# Patient Record
Sex: MALE | Race: BLACK OR AFRICAN AMERICAN | NOT HISPANIC OR LATINO | Employment: UNEMPLOYED | URBAN - METROPOLITAN AREA
[De-identification: names, ages, dates, MRNs, and addresses within clinical notes are randomized per-mention and may not be internally consistent; named-entity substitution may affect disease eponyms.]

---

## 2020-01-19 PROBLEM — S82.142A CLOSED BICONDYLAR FRACTURE OF LEFT TIBIAL PLATEAU: Status: ACTIVE | Noted: 2020-01-19

## 2020-01-19 NOTE — H&P
ELIAS Hill is  a pleasant 34-year-old male who comes into the office today for evaluation and management of his LEFT knee injury.  his history is significant for a skiing accident while he was in [place].  This happened on January 10, 2020.  He was diagnosed with a closed fracture of LEFT tibial plateau and he underwent an external fixator placement.  He has been advised to use Lovenox injections.  He has been nonweightbearing on the LEFT lower extremity.  He is accompanied by his father to the office visit.  He lives in [place] and hence they have requested transfer to his home town.  His father has contacted my office for office appointment.  Patient denies any tingling, numbness of his toes.  Denies any pain out of proportion.  His pain has been reasonably well-controlled with oxycodone 5 mg tablets.  He has been taking 2 pills almost every 4 hours.  In addition, he is also using some ibuprofen.  The patient brings in his medical records including CT scan and x-rays of his injury on a disc.  he denies any other injuries.  Denies any history of MRSA, DVT.  Review of Systems:  Patient denies: Abdominal Pain, Bleeding, Chest Pain, Convulsions/Seizure, Decreased Motion, Depression, Difficulty Swallowing, Easy Bruisability, Emotional Disturbances, Eyes or Vision Problems, Fecal Incontinence, Fever/Chills, Headaches, Increased Thirst, Increased Hunger, Insomnia, Joint Pain, Nausea/Vomiting, Night Sweats, Poor Balance, Persistent Cough, Rash, Shortness of Breath, Shortness of Breath While Lying down, Skin Problems, Urinary Retention and Weakness.  Allergies:  * no known allergies  Medications:  * toradol   * oxycodone andacetaminophen   ibuprofen capsule (ibuprofen caps)   tylenol capsule (acetaminophen caps)   Patient History of:  BLOOD CLOTS/EMBOLISM - NEGATIVE  Surgical History:  Negative  Known Family History of:  Negative  Social History:  JORDAN RIZZO is a single 34 year old male.  He has never used  tobacco products.      Past medical, social, family histories and ROS reviewed today with the patient and changes documented in the chart (01/15/2020).    Physical Exam  Height:  72 in.    Weight:  165.0 lbs.     BMI:  22.46  Pulse:  83  Blood pressure:  126 / 72 mm Hg  Mental/HEENT/Cardio/Skin  The patient's general appearance is well developed, well nourished, no acute distress.  Orientation is alert and oriented x 3.  The patient's mood is normal.  A head exam reveals normocephalic/atraumatic.  An eye exam reveals pupils equal.  Pulmonary exam shows normal air exchange, no labored breathing, or shortness of breath.  A skin exam shows normal temperature and color in the area of examination.      Right Knee      Left Knee  Skin is normal. he has a posterior splint.  Splint was removed and skin was inspected.  No evidence of any blisters.  Pin tracts are healthy.  Patient shows no signs of DVT.  The DP Pulse is 2+.  Capillary refill is normal.  There is severe tenderness in the medial tibial plateau.    Compartments are soft.      Imaging/Diagnostic Studies  I have reviewed the injury film, and a CT scan, 3-D reconstructions with the patient and his father, both films and report was reviewed.  Impression  Left tibial plateau displaced fracture (QMI30-G34.142A)    Plan  The patient has a displaced tibial plateau fracture LEFT leg, status post external fixator placement .    Options were discussed including nonoperative management versus surgical intervention.    The patient is indicated for removal of the external fixator and open reduction and internal fixation of LEFT tibial plateau fracture.  he has severe intra-articular comminution.  I think he will best benefit from a posteromedial approach for addressing the medial tibial plateau, followed by a lateral plate.  The likely risks and benefits of the procedure including but not limited to infection, DVT, pulmonary embolism, malunion, nonunion, post traumatic  stiffness, post traumatic arthritis, possibility of injury to nerves, vessels, tendons have been discussed in detail with the patient and his father.  Despite the risks involved the patient would like to proceed.    The surgery is scheduled for removal of the external fixator and open reduction and internal fixation of LEFT tibial plateau fracture on Jan 21/ 23,2020.  surgery will be scheduled for postoperative observation/admission.  I have advised him to resume the Lovenox injections and discussed with him the importance of anticoagulation.  He is also advised to watch his opiate medication intake.  He is given a prescription for Percocet 7.5/325 mg 1 p.o. q.4 p.r.n. pain.  In addition, it is okay to continue ibuprofen.  Patient does not use tobacco.      We discussed the benefits of surgical intervention, as well as alternative treatments.  Potential surgical risks and complications include but are not limited to expected outcomes and the risk that the procedure may not accomplish the desired objective.  Sufficient opportunity was given to discuss the condition and treatment plan with the doctor, and all questions were answered for the patient.      Sergio should follow up with JENNY GATES MD post op.

## 2020-01-20 ENCOUNTER — HOSPITAL ENCOUNTER (OUTPATIENT)
Dept: GENERAL RADIOLOGY | Facility: HOSPITAL | Age: 35
Discharge: HOME | End: 2020-01-20

## 2020-01-20 ENCOUNTER — APPOINTMENT (OUTPATIENT)
Dept: PREADMISSION TESTING | Facility: HOSPITAL | Age: 35
End: 2020-01-20

## 2020-01-20 ENCOUNTER — HOSPITAL ENCOUNTER (OUTPATIENT)
Dept: GENERAL RADIOLOGY | Facility: HOSPITAL | Age: 35
Discharge: HOME | End: 2020-01-20
Admitting: ORTHOPAEDIC SURGERY

## 2020-01-20 VITALS
SYSTOLIC BLOOD PRESSURE: 123 MMHG | TEMPERATURE: 98.1 F | RESPIRATION RATE: 16 BRPM | BODY MASS INDEX: 22.35 KG/M2 | DIASTOLIC BLOOD PRESSURE: 66 MMHG | HEIGHT: 72 IN | WEIGHT: 165 LBS | OXYGEN SATURATION: 99 % | HEART RATE: 73 BPM

## 2020-01-20 LAB
ALBUMIN SERPL-MCNC: 3.9 G/DL (ref 3.5–5.2)
ALBUMIN/GLOB SERPL: 1.2 G/DL
ALP SERPL-CCNC: 63 U/L (ref 39–117)
ALT SERPL W P-5'-P-CCNC: 10 U/L (ref 1–41)
ANION GAP SERPL CALCULATED.3IONS-SCNC: 12.3 MMOL/L (ref 5–15)
ANISOCYTOSIS BLD QL: NORMAL
APTT PPP: 31.4 SECONDS (ref 22.7–35.4)
AST SERPL-CCNC: 17 U/L (ref 1–40)
BASOPHILS # BLD MANUAL: 0.06 10*3/MM3 (ref 0–0.2)
BASOPHILS NFR BLD AUTO: 1 % (ref 0–1.5)
BILIRUB SERPL-MCNC: 0.2 MG/DL (ref 0.2–1.2)
BUN BLD-MCNC: 25 MG/DL (ref 6–20)
BUN/CREAT SERPL: 26.3 (ref 7–25)
CALCIUM SPEC-SCNC: 9 MG/DL (ref 8.6–10.5)
CHLORIDE SERPL-SCNC: 96 MMOL/L (ref 98–107)
CO2 SERPL-SCNC: 26.7 MMOL/L (ref 22–29)
CREAT BLD-MCNC: 0.95 MG/DL (ref 0.76–1.27)
DEPRECATED RDW RBC AUTO: 38.5 FL (ref 37–54)
EOSINOPHIL # BLD MANUAL: 0.26 10*3/MM3 (ref 0–0.4)
EOSINOPHIL NFR BLD MANUAL: 4.1 % (ref 0.3–6.2)
ERYTHROCYTE [DISTWIDTH] IN BLOOD BY AUTOMATED COUNT: 14.4 % (ref 12.3–15.4)
GFR SERPL CREATININE-BSD FRML MDRD: 91 ML/MIN/1.73
GLOBULIN UR ELPH-MCNC: 3.3 GM/DL
GLUCOSE BLD-MCNC: 90 MG/DL (ref 65–99)
HCT VFR BLD AUTO: 36.9 % (ref 37.5–51)
HGB BLD-MCNC: 11.9 G/DL (ref 13–17.7)
INR PPP: 1 (ref 0.9–1.1)
LYMPHOCYTES # BLD MANUAL: 1.43 10*3/MM3 (ref 0.7–3.1)
LYMPHOCYTES NFR BLD MANUAL: 22.4 % (ref 19.6–45.3)
LYMPHOCYTES NFR BLD MANUAL: 5.1 % (ref 5–12)
MCH RBC QN AUTO: 24.1 PG (ref 26.6–33)
MCHC RBC AUTO-ENTMCNC: 32.2 G/DL (ref 31.5–35.7)
MCV RBC AUTO: 74.8 FL (ref 79–97)
MICROCYTES BLD QL: NORMAL
MONOCYTES # BLD AUTO: 0.33 10*3/MM3 (ref 0.1–0.9)
NEUTROPHILS # BLD AUTO: 4.29 10*3/MM3 (ref 1.7–7)
NEUTROPHILS NFR BLD MANUAL: 67.3 % (ref 42.7–76)
PLAT MORPH BLD: NORMAL
PLATELET # BLD AUTO: 440 10*3/MM3 (ref 140–450)
PMV BLD AUTO: 8.7 FL (ref 6–12)
POTASSIUM BLD-SCNC: 4.1 MMOL/L (ref 3.5–5.2)
PROT SERPL-MCNC: 7.2 G/DL (ref 6–8.5)
PROTHROMBIN TIME: 13 SECONDS (ref 11.7–14.2)
RBC # BLD AUTO: 4.93 10*6/MM3 (ref 4.14–5.8)
SODIUM BLD-SCNC: 135 MMOL/L (ref 136–145)
WBC MORPH BLD: NORMAL
WBC NRBC COR # BLD: 6.38 10*3/MM3 (ref 3.4–10.8)

## 2020-01-20 PROCEDURE — 80053 COMPREHEN METABOLIC PANEL: CPT | Performed by: ORTHOPAEDIC SURGERY

## 2020-01-20 PROCEDURE — 71046 X-RAY EXAM CHEST 2 VIEWS: CPT

## 2020-01-20 PROCEDURE — 85007 BL SMEAR W/DIFF WBC COUNT: CPT | Performed by: ORTHOPAEDIC SURGERY

## 2020-01-20 PROCEDURE — 36415 COLL VENOUS BLD VENIPUNCTURE: CPT

## 2020-01-20 PROCEDURE — 85610 PROTHROMBIN TIME: CPT | Performed by: ORTHOPAEDIC SURGERY

## 2020-01-20 PROCEDURE — 93005 ELECTROCARDIOGRAM TRACING: CPT

## 2020-01-20 PROCEDURE — 85730 THROMBOPLASTIN TIME PARTIAL: CPT | Performed by: ORTHOPAEDIC SURGERY

## 2020-01-20 PROCEDURE — 73590 X-RAY EXAM OF LOWER LEG: CPT

## 2020-01-20 PROCEDURE — 93010 ELECTROCARDIOGRAM REPORT: CPT | Performed by: INTERNAL MEDICINE

## 2020-01-20 PROCEDURE — 85025 COMPLETE CBC W/AUTO DIFF WBC: CPT | Performed by: ORTHOPAEDIC SURGERY

## 2020-01-20 RX ORDER — IBUPROFEN 200 MG
200 TABLET ORAL EVERY 6 HOURS PRN
COMMUNITY
End: 2020-03-23

## 2020-01-20 RX ORDER — OXYCODONE HYDROCHLORIDE AND ACETAMINOPHEN 5; 325 MG/1; MG/1
1 TABLET ORAL EVERY 6 HOURS PRN
COMMUNITY
End: 2021-08-12

## 2020-01-20 NOTE — DISCHARGE INSTRUCTIONS
Take the following medications the morning of surgery:    OXYCODONE    ARRIVE AT  1000.        General Instructions:  • Do not eat solid food after midnight the night before surgery.  • You may drink clear liquids day of surgery but must stop at least one hour before your hospital arrival time.  • It is beneficial for you to have a clear drink that contains carbohydrates the day of surgery.  We suggest a 12 to 20 ounce bottle of Gatorade or Powerade for non-diabetic patients or a 12 to 20 ounce bottle of G2 or Powerade Zero for diabetic patients. (Pediatric patients, are not advised to drink a 12 to 20 ounce carbohydrate drink)    Clear liquids are liquids you can see through.  Nothing red in color.     Plain water                               Sports drinks  Sodas                                   Gelatin (Jell-O)  Fruit juices without pulp such as white grape juice and apple juice  Popsicles that contain no fruit or yogurt  Tea or coffee (no cream or milk added)  Gatorade / Powerade  G2 / Powerade Zero    • Infants may have breast milk up to four hours before surgery.  • Infants drinking formula may drink formula up to six hours before surgery.   • Patients who avoid smoking, chewing tobacco and alcohol for 4 weeks prior to surgery have a reduced risk of post-operative complications.  Quit smoking as many days before surgery as you can.  • Do not smoke, use chewing tobacco or drink alcohol the day of surgery.   • If applicable bring your C-PAP/ BI-PAP machine.  • Bring any papers given to you in the doctor’s office.  • Wear clean comfortable clothes.  • Do not wear contact lenses, false eyelashes or make-up.  Bring a case for your glasses.   • Bring crutches or walker if applicable.  • Remove all piercings.  Leave jewelry and any other valuables at home.  • Hair extensions with metal clips must be removed prior to surgery.  • The Pre-Admission Testing nurse will instruct you to bring medications if unable to  obtain an accurate list in Pre-Admission Testing.        If you were given a blood bank ID arm band remember to bring it with you the day of surgery.    Preventing a Surgical Site Infection:  • For 2 to 3 days before surgery, avoid shaving with a razor because the razor can irritate skin and make it easier to develop an infection.    • Any areas of open skin can increase the risk of a post-operative wound infection by allowing bacteria to enter and travel throughout the body.  Notify your surgeon if you have any skin wounds / rashes even if it is not near the expected surgical site.  The area will need assessed to determine if surgery should be delayed until it is healed.  • The night prior to surgery sleep in a clean bed with clean clothing.  Do not allow pets to sleep with you.  • Shower on the morning of surgery using a fresh bar of anti-bacterial soap (such as Dial) and clean washcloth.  Dry with a clean towel and dress in clean clothing.  • Ask your surgeon if you will be receiving antibiotics prior to surgery.  • Make sure you, your family, and all healthcare providers clean their hands with soap and water or an alcohol based hand  before caring for you or your wound.    Day of surgery:  Your arrival time is approximately two hours before your scheduled surgery time.  Upon arrival, a Pre-op nurse and Anesthesiologist will review your health history, obtain vital signs, and answer questions you may have.  The only belongings needed at this time will be a list of your home medications and if applicable your C-PAP/BI-PAP machine.  If you are staying overnight your family can leave the rest of your belongings in the car and bring them to your room later.  A Pre-op nurse will start an IV and you may receive medication in preparation for surgery, including something to help you relax.  Your family will be able to see you in the Pre-op area.  Two visitors at a time will be allowed in the Pre-op room.  While  you are in surgery your family should notify the waiting room  if they leave the waiting room area and provide a contact phone number.    Please be aware that surgery does come with discomfort.  We want to make every effort to control your discomfort so please discuss any uncontrolled symptoms with your nurse.   Your doctor will most likely have prescribed pain medications.      If you are going home after surgery you will receive individualized written care instructions before being discharged.  A responsible adult must drive you to and from the hospital on the day of your surgery and stay with you for 24 hours.    If you are staying overnight following surgery, you will be transported to your hospital room following the recovery period.  [facility] has all private rooms.    If you have any questions please call Pre-Admission Testing at [phone].  Deductibles and co-payments are collected on the day of service. Please be prepared to pay the required co-pay, deductible or deposit on the day of service as defined by your plan.

## 2020-01-21 ENCOUNTER — HOSPITAL ENCOUNTER (INPATIENT)
Facility: HOSPITAL | Age: 35
LOS: 1 days | Discharge: HOME | End: 2020-01-22
Attending: ORTHOPAEDIC SURGERY | Admitting: ORTHOPAEDIC SURGERY

## 2020-01-21 ENCOUNTER — ANESTHESIA EVENT (OUTPATIENT)
Dept: PERIOP | Facility: HOSPITAL | Age: 35
End: 2020-01-21

## 2020-01-21 ENCOUNTER — APPOINTMENT (OUTPATIENT)
Dept: GENERAL RADIOLOGY | Facility: HOSPITAL | Age: 35
End: 2020-01-21

## 2020-01-21 ENCOUNTER — ANESTHESIA (OUTPATIENT)
Dept: PERIOP | Facility: HOSPITAL | Age: 35
End: 2020-01-21

## 2020-01-21 DIAGNOSIS — S82.142A CLOSED BICONDYLAR FRACTURE OF LEFT TIBIAL PLATEAU: Primary | ICD-10-CM

## 2020-01-21 PROCEDURE — C1713 ANCHOR/SCREW BN/BN,TIS/BN: HCPCS | Performed by: ORTHOPAEDIC SURGERY

## 2020-01-21 PROCEDURE — 25010000002 MIDAZOLAM PER 1 MG: Performed by: ANESTHESIOLOGY

## 2020-01-21 PROCEDURE — 25010000002 PROMETHAZINE PER 50 MG: Performed by: NURSE ANESTHETIST, CERTIFIED REGISTERED

## 2020-01-21 PROCEDURE — 25010000002 KETOROLAC TROMETHAMINE PER 15 MG: Performed by: ORTHOPAEDIC SURGERY

## 2020-01-21 PROCEDURE — 25010000002 FENTANYL CITRATE (PF) 100 MCG/2ML SOLUTION: Performed by: NURSE ANESTHETIST, CERTIFIED REGISTERED

## 2020-01-21 PROCEDURE — 25010000002 ONDANSETRON PER 1 MG: Performed by: ANESTHESIOLOGY

## 2020-01-21 PROCEDURE — 25010000002 HYDROMORPHONE PER 4 MG: Performed by: NURSE ANESTHETIST, CERTIFIED REGISTERED

## 2020-01-21 PROCEDURE — 25010000002 NEOSTIGMINE 0.5 MG/ML SOLUTION: Performed by: ANESTHESIOLOGY

## 2020-01-21 PROCEDURE — 25010000002 VANCOMYCIN 1 G RECONSTITUTED SOLUTION: Performed by: STUDENT IN AN ORGANIZED HEALTH CARE EDUCATION/TRAINING PROGRAM

## 2020-01-21 PROCEDURE — 0QPH05Z REMOVAL OF EXTERNAL FIXATION DEVICE FROM LEFT TIBIA, OPEN APPROACH: ICD-10-PCS | Performed by: ORTHOPAEDIC SURGERY

## 2020-01-21 PROCEDURE — 25010000003 CEFAZOLIN IN DEXTROSE 2-4 GM/100ML-% SOLUTION: Performed by: ORTHOPAEDIC SURGERY

## 2020-01-21 PROCEDURE — 76000 FLUOROSCOPY <1 HR PHYS/QHP: CPT

## 2020-01-21 PROCEDURE — 25010000002 DEXAMETHASONE PER 1 MG: Performed by: NURSE ANESTHETIST, CERTIFIED REGISTERED

## 2020-01-21 PROCEDURE — 73560 X-RAY EXAM OF KNEE 1 OR 2: CPT

## 2020-01-21 PROCEDURE — 25010000002 DEXAMETHASONE PER 1 MG: Performed by: ANESTHESIOLOGY

## 2020-01-21 PROCEDURE — L1830 KO IMMOB CANVAS LONG PRE OTS: HCPCS | Performed by: ORTHOPAEDIC SURGERY

## 2020-01-21 PROCEDURE — 25010000002 HYDROMORPHONE PER 4 MG: Performed by: STUDENT IN AN ORGANIZED HEALTH CARE EDUCATION/TRAINING PROGRAM

## 2020-01-21 PROCEDURE — 25010000002 VANCOMYCIN PER 500 MG: Performed by: ORTHOPAEDIC SURGERY

## 2020-01-21 PROCEDURE — 25010000002 PROPOFOL 10 MG/ML EMULSION: Performed by: NURSE ANESTHETIST, CERTIFIED REGISTERED

## 2020-01-21 PROCEDURE — 73590 X-RAY EXAM OF LOWER LEG: CPT

## 2020-01-21 PROCEDURE — 0QSH04Z REPOSITION LEFT TIBIA WITH INTERNAL FIXATION DEVICE, OPEN APPROACH: ICD-10-PCS | Performed by: ORTHOPAEDIC SURGERY

## 2020-01-21 PROCEDURE — 25010000002 ONDANSETRON PER 1 MG: Performed by: NURSE ANESTHETIST, CERTIFIED REGISTERED

## 2020-01-21 DEVICE — IMPLANTABLE DEVICE: Type: IMPLANTABLE DEVICE | Site: LEG | Status: FUNCTIONAL

## 2020-01-21 DEVICE — SCRW CORT S/TAP 3.5X46MM: Type: IMPLANTABLE DEVICE | Site: TIBIA | Status: FUNCTIONAL

## 2020-01-21 DEVICE — IMPLANTABLE DEVICE: Type: IMPLANTABLE DEVICE | Site: TIBIA | Status: FUNCTIONAL

## 2020-01-21 DEVICE — SCRW LK S/TAP STRDRV 3.5X34MM: Type: IMPLANTABLE DEVICE | Site: TIBIA | Status: FUNCTIONAL

## 2020-01-21 RX ORDER — PROMETHAZINE HYDROCHLORIDE 25 MG/1
25 SUPPOSITORY RECTAL ONCE AS NEEDED
Status: DISCONTINUED | OUTPATIENT
Start: 2020-01-21 | End: 2020-01-21 | Stop reason: HOSPADM

## 2020-01-21 RX ORDER — MAGNESIUM HYDROXIDE 1200 MG/15ML
LIQUID ORAL AS NEEDED
Status: DISCONTINUED | OUTPATIENT
Start: 2020-01-21 | End: 2020-01-21 | Stop reason: HOSPADM

## 2020-01-21 RX ORDER — VANCOMYCIN HYDROCHLORIDE 1 G/20ML
INJECTION, POWDER, LYOPHILIZED, FOR SOLUTION INTRAVENOUS AS NEEDED
Status: DISCONTINUED | OUTPATIENT
Start: 2020-01-21 | End: 2020-01-21 | Stop reason: SURG

## 2020-01-21 RX ORDER — DEXAMETHASONE SODIUM PHOSPHATE 4 MG/ML
INJECTION, SOLUTION INTRA-ARTICULAR; INTRALESIONAL; INTRAMUSCULAR; INTRAVENOUS; SOFT TISSUE
Status: COMPLETED | OUTPATIENT
Start: 2020-01-21 | End: 2020-01-21

## 2020-01-21 RX ORDER — ONDANSETRON 2 MG/ML
4 INJECTION INTRAMUSCULAR; INTRAVENOUS EVERY 6 HOURS PRN
Status: DISCONTINUED | OUTPATIENT
Start: 2020-01-21 | End: 2020-01-22 | Stop reason: HOSPADM

## 2020-01-21 RX ORDER — SODIUM CHLORIDE 0.9 % (FLUSH) 0.9 %
3 SYRINGE (ML) INJECTION EVERY 12 HOURS SCHEDULED
Status: DISCONTINUED | OUTPATIENT
Start: 2020-01-21 | End: 2020-01-21 | Stop reason: HOSPADM

## 2020-01-21 RX ORDER — OXYCODONE AND ACETAMINOPHEN 7.5; 325 MG/1; MG/1
1 TABLET ORAL EVERY 4 HOURS PRN
Status: DISCONTINUED | OUTPATIENT
Start: 2020-01-21 | End: 2020-01-22 | Stop reason: HOSPADM

## 2020-01-21 RX ORDER — FLUMAZENIL 0.1 MG/ML
0.2 INJECTION INTRAVENOUS AS NEEDED
Status: DISCONTINUED | OUTPATIENT
Start: 2020-01-21 | End: 2020-01-21 | Stop reason: HOSPADM

## 2020-01-21 RX ORDER — HYDROMORPHONE HYDROCHLORIDE 1 MG/ML
0.5 INJECTION, SOLUTION INTRAMUSCULAR; INTRAVENOUS; SUBCUTANEOUS
Status: DISCONTINUED | OUTPATIENT
Start: 2020-01-21 | End: 2020-01-21 | Stop reason: HOSPADM

## 2020-01-21 RX ORDER — EPHEDRINE SULFATE 50 MG/ML
5 INJECTION, SOLUTION INTRAVENOUS ONCE AS NEEDED
Status: DISCONTINUED | OUTPATIENT
Start: 2020-01-21 | End: 2020-01-21 | Stop reason: HOSPADM

## 2020-01-21 RX ORDER — PROPOFOL 10 MG/ML
VIAL (ML) INTRAVENOUS AS NEEDED
Status: DISCONTINUED | OUTPATIENT
Start: 2020-01-21 | End: 2020-01-21 | Stop reason: SURG

## 2020-01-21 RX ORDER — SODIUM CHLORIDE 0.9 % (FLUSH) 0.9 %
3-10 SYRINGE (ML) INJECTION AS NEEDED
Status: DISCONTINUED | OUTPATIENT
Start: 2020-01-21 | End: 2020-01-21 | Stop reason: HOSPADM

## 2020-01-21 RX ORDER — FAMOTIDINE 10 MG/ML
20 INJECTION, SOLUTION INTRAVENOUS ONCE
Status: COMPLETED | OUTPATIENT
Start: 2020-01-21 | End: 2020-01-21

## 2020-01-21 RX ORDER — ACETAMINOPHEN 500 MG
500 TABLET ORAL EVERY 6 HOURS PRN
COMMUNITY
End: 2021-08-12

## 2020-01-21 RX ORDER — FENTANYL CITRATE 50 UG/ML
INJECTION, SOLUTION INTRAMUSCULAR; INTRAVENOUS AS NEEDED
Status: DISCONTINUED | OUTPATIENT
Start: 2020-01-21 | End: 2020-01-21 | Stop reason: SURG

## 2020-01-21 RX ORDER — HYDRALAZINE HYDROCHLORIDE 20 MG/ML
5 INJECTION INTRAMUSCULAR; INTRAVENOUS
Status: DISCONTINUED | OUTPATIENT
Start: 2020-01-21 | End: 2020-01-21 | Stop reason: HOSPADM

## 2020-01-21 RX ORDER — SODIUM CHLORIDE 9 MG/ML
INJECTION, SOLUTION INTRAVENOUS AS NEEDED
Status: DISCONTINUED | OUTPATIENT
Start: 2020-01-21 | End: 2020-01-21 | Stop reason: HOSPADM

## 2020-01-21 RX ORDER — HYDROMORPHONE HCL 110MG/55ML
PATIENT CONTROLLED ANALGESIA SYRINGE INTRAVENOUS AS NEEDED
Status: DISCONTINUED | OUTPATIENT
Start: 2020-01-21 | End: 2020-01-21 | Stop reason: SURG

## 2020-01-21 RX ORDER — OXYCODONE AND ACETAMINOPHEN 7.5; 325 MG/1; MG/1
1 TABLET ORAL ONCE AS NEEDED
Status: DISCONTINUED | OUTPATIENT
Start: 2020-01-21 | End: 2020-01-21 | Stop reason: HOSPADM

## 2020-01-21 RX ORDER — ONDANSETRON 2 MG/ML
4 INJECTION INTRAMUSCULAR; INTRAVENOUS ONCE AS NEEDED
Status: COMPLETED | OUTPATIENT
Start: 2020-01-21 | End: 2020-01-21

## 2020-01-21 RX ORDER — DOCUSATE SODIUM 100 MG/1
100 CAPSULE, LIQUID FILLED ORAL 2 TIMES DAILY PRN
Status: DISCONTINUED | OUTPATIENT
Start: 2020-01-21 | End: 2020-01-22 | Stop reason: HOSPADM

## 2020-01-21 RX ORDER — BISACODYL 5 MG/1
10 TABLET, DELAYED RELEASE ORAL DAILY PRN
Status: DISCONTINUED | OUTPATIENT
Start: 2020-01-21 | End: 2020-01-22 | Stop reason: HOSPADM

## 2020-01-21 RX ORDER — ONDANSETRON 2 MG/ML
INJECTION INTRAMUSCULAR; INTRAVENOUS AS NEEDED
Status: DISCONTINUED | OUTPATIENT
Start: 2020-01-21 | End: 2020-01-21 | Stop reason: SURG

## 2020-01-21 RX ORDER — DEXAMETHASONE SODIUM PHOSPHATE 10 MG/ML
INJECTION INTRAMUSCULAR; INTRAVENOUS AS NEEDED
Status: DISCONTINUED | OUTPATIENT
Start: 2020-01-21 | End: 2020-01-21 | Stop reason: SURG

## 2020-01-21 RX ORDER — KETOROLAC TROMETHAMINE 30 MG/ML
30 INJECTION, SOLUTION INTRAMUSCULAR; INTRAVENOUS EVERY 6 HOURS PRN
Status: COMPLETED | OUTPATIENT
Start: 2020-01-21 | End: 2020-01-22

## 2020-01-21 RX ORDER — LIDOCAINE HYDROCHLORIDE 10 MG/ML
0.5 INJECTION, SOLUTION EPIDURAL; INFILTRATION; INTRACAUDAL; PERINEURAL ONCE AS NEEDED
Status: DISCONTINUED | OUTPATIENT
Start: 2020-01-21 | End: 2020-01-21 | Stop reason: HOSPADM

## 2020-01-21 RX ORDER — NALOXONE HCL 0.4 MG/ML
0.1 VIAL (ML) INJECTION
Status: DISCONTINUED | OUTPATIENT
Start: 2020-01-21 | End: 2020-01-22 | Stop reason: HOSPADM

## 2020-01-21 RX ORDER — DIPHENHYDRAMINE HYDROCHLORIDE 50 MG/ML
12.5 INJECTION INTRAMUSCULAR; INTRAVENOUS
Status: DISCONTINUED | OUTPATIENT
Start: 2020-01-21 | End: 2020-01-21 | Stop reason: HOSPADM

## 2020-01-21 RX ORDER — FENTANYL CITRATE 50 UG/ML
50 INJECTION, SOLUTION INTRAMUSCULAR; INTRAVENOUS
Status: DISCONTINUED | OUTPATIENT
Start: 2020-01-21 | End: 2020-01-21 | Stop reason: HOSPADM

## 2020-01-21 RX ORDER — CEFAZOLIN SODIUM 2 G/100ML
2 INJECTION, SOLUTION INTRAVENOUS ONCE
Status: COMPLETED | OUTPATIENT
Start: 2020-01-21 | End: 2020-01-21

## 2020-01-21 RX ORDER — PROMETHAZINE HYDROCHLORIDE 25 MG/1
25 TABLET ORAL ONCE AS NEEDED
Status: DISCONTINUED | OUTPATIENT
Start: 2020-01-21 | End: 2020-01-21 | Stop reason: HOSPADM

## 2020-01-21 RX ORDER — LABETALOL HYDROCHLORIDE 5 MG/ML
5 INJECTION, SOLUTION INTRAVENOUS
Status: DISCONTINUED | OUTPATIENT
Start: 2020-01-21 | End: 2020-01-21 | Stop reason: HOSPADM

## 2020-01-21 RX ORDER — MIDAZOLAM HYDROCHLORIDE 1 MG/ML
2 INJECTION INTRAMUSCULAR; INTRAVENOUS
Status: DISCONTINUED | OUTPATIENT
Start: 2020-01-21 | End: 2020-01-21 | Stop reason: HOSPADM

## 2020-01-21 RX ORDER — OXYCODONE AND ACETAMINOPHEN 7.5; 325 MG/1; MG/1
2 TABLET ORAL EVERY 4 HOURS PRN
Status: DISCONTINUED | OUTPATIENT
Start: 2020-01-21 | End: 2020-01-22 | Stop reason: HOSPADM

## 2020-01-21 RX ORDER — HYDROCODONE BITARTRATE AND ACETAMINOPHEN 7.5; 325 MG/1; MG/1
1 TABLET ORAL ONCE AS NEEDED
Status: DISCONTINUED | OUTPATIENT
Start: 2020-01-21 | End: 2020-01-21 | Stop reason: HOSPADM

## 2020-01-21 RX ORDER — HYDROMORPHONE HYDROCHLORIDE 1 MG/ML
0.5 INJECTION, SOLUTION INTRAMUSCULAR; INTRAVENOUS; SUBCUTANEOUS
Status: DISCONTINUED | OUTPATIENT
Start: 2020-01-21 | End: 2020-01-22 | Stop reason: HOSPADM

## 2020-01-21 RX ORDER — PROMETHAZINE HYDROCHLORIDE 25 MG/ML
12.5 INJECTION, SOLUTION INTRAMUSCULAR; INTRAVENOUS ONCE AS NEEDED
Status: DISCONTINUED | OUTPATIENT
Start: 2020-01-21 | End: 2020-01-21 | Stop reason: HOSPADM

## 2020-01-21 RX ORDER — MIDAZOLAM HYDROCHLORIDE 1 MG/ML
1 INJECTION INTRAMUSCULAR; INTRAVENOUS
Status: DISCONTINUED | OUTPATIENT
Start: 2020-01-21 | End: 2020-01-21 | Stop reason: HOSPADM

## 2020-01-21 RX ORDER — DIPHENHYDRAMINE HCL 25 MG
25 CAPSULE ORAL
Status: DISCONTINUED | OUTPATIENT
Start: 2020-01-21 | End: 2020-01-21 | Stop reason: HOSPADM

## 2020-01-21 RX ORDER — ROCURONIUM BROMIDE 10 MG/ML
INJECTION, SOLUTION INTRAVENOUS AS NEEDED
Status: DISCONTINUED | OUTPATIENT
Start: 2020-01-21 | End: 2020-01-21 | Stop reason: SURG

## 2020-01-21 RX ORDER — CEFAZOLIN SODIUM 2 G/100ML
2 INJECTION, SOLUTION INTRAVENOUS EVERY 8 HOURS
Status: COMPLETED | OUTPATIENT
Start: 2020-01-22 | End: 2020-01-22

## 2020-01-21 RX ORDER — ACETAMINOPHEN 325 MG/1
650 TABLET ORAL ONCE AS NEEDED
Status: DISCONTINUED | OUTPATIENT
Start: 2020-01-21 | End: 2020-01-21 | Stop reason: HOSPADM

## 2020-01-21 RX ORDER — NALOXONE HCL 0.4 MG/ML
0.2 VIAL (ML) INJECTION AS NEEDED
Status: DISCONTINUED | OUTPATIENT
Start: 2020-01-21 | End: 2020-01-21 | Stop reason: HOSPADM

## 2020-01-21 RX ORDER — VANCOMYCIN HYDROCHLORIDE 1 G/200ML
1000 INJECTION, SOLUTION INTRAVENOUS EVERY 12 HOURS
Status: COMPLETED | OUTPATIENT
Start: 2020-01-21 | End: 2020-01-21

## 2020-01-21 RX ORDER — GLYCOPYRROLATE 0.2 MG/ML
INJECTION INTRAMUSCULAR; INTRAVENOUS AS NEEDED
Status: DISCONTINUED | OUTPATIENT
Start: 2020-01-21 | End: 2020-01-21 | Stop reason: SURG

## 2020-01-21 RX ORDER — ONDANSETRON 4 MG/1
4 TABLET, FILM COATED ORAL EVERY 6 HOURS PRN
Status: DISCONTINUED | OUTPATIENT
Start: 2020-01-21 | End: 2020-01-22 | Stop reason: HOSPADM

## 2020-01-21 RX ORDER — BUPIVACAINE HYDROCHLORIDE AND EPINEPHRINE 2.5; 5 MG/ML; UG/ML
INJECTION, SOLUTION EPIDURAL; INFILTRATION; INTRACAUDAL; PERINEURAL
Status: COMPLETED | OUTPATIENT
Start: 2020-01-21 | End: 2020-01-21

## 2020-01-21 RX ORDER — SODIUM CHLORIDE, SODIUM LACTATE, POTASSIUM CHLORIDE, CALCIUM CHLORIDE 600; 310; 30; 20 MG/100ML; MG/100ML; MG/100ML; MG/100ML
9 INJECTION, SOLUTION INTRAVENOUS CONTINUOUS
Status: DISCONTINUED | OUTPATIENT
Start: 2020-01-21 | End: 2020-01-21 | Stop reason: HOSPADM

## 2020-01-21 RX ORDER — PROMETHAZINE HYDROCHLORIDE 25 MG/ML
6.25 INJECTION, SOLUTION INTRAMUSCULAR; INTRAVENOUS
Status: DISCONTINUED | OUTPATIENT
Start: 2020-01-21 | End: 2020-01-21 | Stop reason: HOSPADM

## 2020-01-21 RX ORDER — LIDOCAINE HYDROCHLORIDE 20 MG/ML
INJECTION, SOLUTION INFILTRATION; PERINEURAL AS NEEDED
Status: DISCONTINUED | OUTPATIENT
Start: 2020-01-21 | End: 2020-01-21 | Stop reason: SURG

## 2020-01-21 RX ADMIN — ROCURONIUM BROMIDE 20 MG: 10 INJECTION INTRAVENOUS at 17:39

## 2020-01-21 RX ADMIN — CEFAZOLIN SODIUM 2 G: 2 INJECTION, SOLUTION INTRAVENOUS at 16:28

## 2020-01-21 RX ADMIN — PROPOFOL 200 MG: 10 INJECTION, EMULSION INTRAVENOUS at 16:35

## 2020-01-21 RX ADMIN — PROMETHAZINE HYDROCHLORIDE 6.25 MG: 25 INJECTION INTRAMUSCULAR; INTRAVENOUS at 20:31

## 2020-01-21 RX ADMIN — VANCOMYCIN HYDROCHLORIDE 1 G: 1 INJECTION, POWDER, LYOPHILIZED, FOR SOLUTION INTRAVENOUS at 16:28

## 2020-01-21 RX ADMIN — CEFAZOLIN SODIUM 2 G: 2 INJECTION, SOLUTION INTRAVENOUS at 23:58

## 2020-01-21 RX ADMIN — HYDROMORPHONE HYDROCHLORIDE 0.5 MG: 1 INJECTION, SOLUTION INTRAMUSCULAR; INTRAVENOUS; SUBCUTANEOUS at 21:23

## 2020-01-21 RX ADMIN — ONDANSETRON HYDROCHLORIDE 4 MG: 2 SOLUTION INTRAMUSCULAR; INTRAVENOUS at 19:11

## 2020-01-21 RX ADMIN — NEOSTIGMINE METHYLSULFATE 4 MG: 5 INJECTION, SOLUTION INTRAMUSCULAR; INTRAVENOUS; SUBCUTANEOUS at 19:05

## 2020-01-21 RX ADMIN — HYDROMORPHONE HYDROCHLORIDE 0.5 MG: 1 INJECTION, SOLUTION INTRAMUSCULAR; INTRAVENOUS; SUBCUTANEOUS at 21:39

## 2020-01-21 RX ADMIN — ROCURONIUM BROMIDE 20 MG: 10 INJECTION INTRAVENOUS at 17:05

## 2020-01-21 RX ADMIN — HYDROMORPHONE HYDROCHLORIDE 0.5 MG: 2 INJECTION, SOLUTION INTRAMUSCULAR; INTRAVENOUS; SUBCUTANEOUS at 18:43

## 2020-01-21 RX ADMIN — SODIUM CHLORIDE, POTASSIUM CHLORIDE, SODIUM LACTATE AND CALCIUM CHLORIDE 9 ML/HR: 600; 310; 30; 20 INJECTION, SOLUTION INTRAVENOUS at 10:43

## 2020-01-21 RX ADMIN — FENTANYL CITRATE 50 MCG: 50 INJECTION, SOLUTION INTRAMUSCULAR; INTRAVENOUS at 17:04

## 2020-01-21 RX ADMIN — HYDROMORPHONE HYDROCHLORIDE 0.5 MG: 2 INJECTION, SOLUTION INTRAMUSCULAR; INTRAVENOUS; SUBCUTANEOUS at 19:11

## 2020-01-21 RX ADMIN — GLYCOPYRROLATE 0.4 MG: 0.2 INJECTION INTRAMUSCULAR; INTRAVENOUS at 19:05

## 2020-01-21 RX ADMIN — ROCURONIUM BROMIDE 10 MG: 10 INJECTION INTRAVENOUS at 18:00

## 2020-01-21 RX ADMIN — FENTANYL CITRATE 50 MCG: 50 INJECTION, SOLUTION INTRAMUSCULAR; INTRAVENOUS at 21:11

## 2020-01-21 RX ADMIN — OXYCODONE HYDROCHLORIDE AND ACETAMINOPHEN 2 TABLET: 7.5; 325 TABLET ORAL at 23:21

## 2020-01-21 RX ADMIN — ONDANSETRON 4 MG: 2 INJECTION INTRAMUSCULAR; INTRAVENOUS at 20:31

## 2020-01-21 RX ADMIN — HYDROMORPHONE HYDROCHLORIDE 0.5 MG: 2 INJECTION, SOLUTION INTRAMUSCULAR; INTRAVENOUS; SUBCUTANEOUS at 19:09

## 2020-01-21 RX ADMIN — HYDROMORPHONE HYDROCHLORIDE 0.5 MG: 2 INJECTION, SOLUTION INTRAMUSCULAR; INTRAVENOUS; SUBCUTANEOUS at 18:28

## 2020-01-21 RX ADMIN — FAMOTIDINE 20 MG: 10 INJECTION INTRAVENOUS at 14:19

## 2020-01-21 RX ADMIN — DEXAMETHASONE SODIUM PHOSPHATE 4 MG: 4 INJECTION INTRA-ARTICULAR; INTRALESIONAL; INTRAMUSCULAR; INTRAVENOUS; SOFT TISSUE at 16:44

## 2020-01-21 RX ADMIN — LIDOCAINE HYDROCHLORIDE 100 MG: 20 INJECTION, SOLUTION INFILTRATION; PERINEURAL at 16:35

## 2020-01-21 RX ADMIN — BUPIVACAINE HYDROCHLORIDE AND EPINEPHRINE 30 ML: 2.5; 5 INJECTION, SOLUTION EPIDURAL; INFILTRATION; INTRACAUDAL; PERINEURAL at 16:44

## 2020-01-21 RX ADMIN — KETOROLAC TROMETHAMINE 30 MG: 30 INJECTION, SOLUTION INTRAMUSCULAR at 21:46

## 2020-01-21 RX ADMIN — FENTANYL CITRATE 50 MCG: 50 INJECTION, SOLUTION INTRAMUSCULAR; INTRAVENOUS at 22:02

## 2020-01-21 RX ADMIN — FENTANYL CITRATE 50 MCG: 50 INJECTION, SOLUTION INTRAMUSCULAR; INTRAVENOUS at 17:31

## 2020-01-21 RX ADMIN — FENTANYL CITRATE 50 MCG: 50 INJECTION, SOLUTION INTRAMUSCULAR; INTRAVENOUS at 18:00

## 2020-01-21 RX ADMIN — HYDROMORPHONE HYDROCHLORIDE 0.5 MG: 1 INJECTION, SOLUTION INTRAMUSCULAR; INTRAVENOUS; SUBCUTANEOUS at 22:02

## 2020-01-21 RX ADMIN — DEXAMETHASONE SODIUM PHOSPHATE 8 MG: 10 INJECTION INTRAMUSCULAR; INTRAVENOUS at 17:18

## 2020-01-21 RX ADMIN — MIDAZOLAM 1 MG: 1 INJECTION INTRAMUSCULAR; INTRAVENOUS at 14:22

## 2020-01-21 RX ADMIN — FENTANYL CITRATE 50 MCG: 50 INJECTION, SOLUTION INTRAMUSCULAR; INTRAVENOUS at 21:40

## 2020-01-21 RX ADMIN — FENTANYL CITRATE 100 MCG: 50 INJECTION, SOLUTION INTRAMUSCULAR; INTRAVENOUS at 16:35

## 2020-01-21 RX ADMIN — ROCURONIUM BROMIDE 50 MG: 10 INJECTION INTRAVENOUS at 16:35

## 2020-01-21 RX ADMIN — VANCOMYCIN HYDROCHLORIDE 1000 MG: 1 INJECTION, SOLUTION INTRAVENOUS at 16:22

## 2020-01-21 NOTE — NURSING NOTE
Dr Champion and Dr Tom at bedside conversing with patient's father Dr ElIban and the patient Mr Ferrera.  Decision shared to await placement of block per Dr Champion until pt is asleep under anesthesia and a high leg block to be placed at that time with additional lower leg coverage block placed as needed per Dr Champion.  Pt and pt's father both agreeable to plan and deny further questions at this time.

## 2020-01-21 NOTE — ANESTHESIA PREPROCEDURE EVALUATION
Anesthesia Evaluation     Patient summary reviewed and Nursing notes reviewed                Airway   Mallampati: I  TM distance: >3 FB  Neck ROM: full  No difficulty expected  Dental      Pulmonary - negative pulmonary ROS   Cardiovascular - negative cardio ROS    ECG reviewed  Rhythm: regular  Rate: normal        Neuro/Psych- negative ROS  GI/Hepatic/Renal/Endo - negative ROS     Musculoskeletal (-) negative ROS    Abdominal    Substance History - negative use     OB/GYN negative ob/gyn ROS         Other                        Anesthesia Plan    ASA 1     general with block     intravenous induction     Anesthetic plan, all risks, benefits, and alternatives have been provided, discussed and informed consent has been obtained with: patient.

## 2020-01-21 NOTE — NURSING NOTE
Patient refused pain medication or sedation medication, offered q half hr while in care of Holding unit, pt repeatedly refused. Dr Jacinto initially then Dr Chapmion kept abreast.   Waste demonstrated/recorded with Mady SYED

## 2020-01-21 NOTE — ANESTHESIA PROCEDURE NOTES
Peripheral Block      Patient reassessed immediately prior to procedure    Patient location during procedure: holding area  Start time: 1/21/2020 4:38 PM  Stop time: 1/21/2020 4:44 PM  Reason for block: at surgeon's request, post-op pain management and secondary anesthetic  Performed by  Anesthesiologist: Tolu Guevara MD  Preanesthetic Checklist  Completed: patient identified, surgical consent, pre-op evaluation, timeout performed, IV checked, risks and benefits discussed and monitors and equipment checked  Prep:  Pt Position: supine  Sterile barriers:gloves, mask and sterile barriers  Prep: ChloraPrep  Patient monitoring: blood pressure monitoring, continuous pulse oximetry and EKG  Procedure  Sedation:yes  Performed under: local infiltration  Guidance:ultrasound guided  ULTRASOUND INTERPRETATION.  Using ultrasound guidance a 22 G gauge needle was placed in close proximity to the femoral nerve, at which point, under ultrasound guidance anesthetic was injected in the area of the nerve and spread of the anesthesia was seen on ultrasound in close proximity thereto.  There were no abnormalities seen on ultrasound; a digital image was taken; and the patient tolerated the procedure with no complications. Images:still images not obtained    Laterality:left  Block Type:femoral  Injection Technique:single-shot  Needle Type:echogenic  Needle Gauge:22 G  Resistance on Injection: none    Medications Used: bupivacaine-EPINEPHrine PF (MARCAINE w/EPI) 0.25% -[id number] injection, 30 mL  dexamethasone (DECADRON) injection, 4 mg  Med admintered at 1/21/2020 4:44 PM      Post Assessment  Injection Assessment: negative aspiration for heme, no paresthesia on injection and incremental injection  Patient Tolerance:comfortable throughout block  Complications:no  Additional Notes  * No Diagnosis Codes entered *

## 2020-01-21 NOTE — ANESTHESIA PROCEDURE NOTES
Airway  Urgency: elective    Date/Time: 1/21/2020 4:36 PM  Airway not difficult    General Information and Staff    Patient location during procedure: OR  Anesthesiologist: Tolu Guevara MD  CRNA: Griselda Huddleston CRNA    Indications and Patient Condition  Indications for airway management: airway protection    Preoxygenated: yes  MILS not maintained throughout  Mask difficulty assessment: 1 - vent by mask    Final Airway Details  Final airway type: endotracheal airway      Successful airway: ETT  Cuffed: yes   Successful intubation technique: direct laryngoscopy  Facilitating devices/methods: intubating stylet  Endotracheal tube insertion site: oral  Blade: Doug  Blade size: 3  ETT size (mm): 8.0  Cormack-Lehane Classification: grade I - full view of glottis  Placement verified by: chest auscultation   Cuff volume (mL): 9  Measured from: lips  ETT/EBT  to lips (cm): 24  Number of attempts at approach: 1  Assessment: lips, teeth, and gum same as pre-op and atraumatic intubation    Additional Comments  PreO2 100% face mask, IV induction, easy mask, DVL x1 per Dr. Guevara, cords noted, tube through, cuff up, EBBSH, +etCO2, = chest movement, tube secured in place, atraumatic, teeth and lips intact as preop.

## 2020-01-22 VITALS
TEMPERATURE: 99.4 F | DIASTOLIC BLOOD PRESSURE: 52 MMHG | SYSTOLIC BLOOD PRESSURE: 102 MMHG | RESPIRATION RATE: 16 BRPM | BODY MASS INDEX: 22.35 KG/M2 | HEIGHT: 72 IN | HEART RATE: 71 BPM | OXYGEN SATURATION: 99 % | WEIGHT: 165 LBS

## 2020-01-22 PROCEDURE — 25010000003 CEFAZOLIN IN DEXTROSE 2-4 GM/100ML-% SOLUTION: Performed by: ORTHOPAEDIC SURGERY

## 2020-01-22 PROCEDURE — 25010000002 KETOROLAC TROMETHAMINE PER 15 MG: Performed by: ORTHOPAEDIC SURGERY

## 2020-01-22 PROCEDURE — 97110 THERAPEUTIC EXERCISES: CPT

## 2020-01-22 PROCEDURE — 97161 PT EVAL LOW COMPLEX 20 MIN: CPT

## 2020-01-22 PROCEDURE — 25010000002 ENOXAPARIN PER 10 MG: Performed by: ORTHOPAEDIC SURGERY

## 2020-01-22 RX ADMIN — KETOROLAC TROMETHAMINE 30 MG: 30 INJECTION, SOLUTION INTRAMUSCULAR at 03:22

## 2020-01-22 RX ADMIN — CEFAZOLIN SODIUM 2 G: 2 INJECTION, SOLUTION INTRAVENOUS at 08:14

## 2020-01-22 RX ADMIN — KETOROLAC TROMETHAMINE 30 MG: 30 INJECTION, SOLUTION INTRAMUSCULAR at 09:28

## 2020-01-22 RX ADMIN — ENOXAPARIN SODIUM 40 MG: 40 INJECTION SUBCUTANEOUS at 08:33

## 2020-01-22 RX ADMIN — OXYCODONE HYDROCHLORIDE AND ACETAMINOPHEN 2 TABLET: 7.5; 325 TABLET ORAL at 08:14

## 2020-01-22 RX ADMIN — OXYCODONE HYDROCHLORIDE AND ACETAMINOPHEN 2 TABLET: 7.5; 325 TABLET ORAL at 03:23

## 2020-01-22 RX ADMIN — OXYCODONE HYDROCHLORIDE AND ACETAMINOPHEN 2 TABLET: 7.5; 325 TABLET ORAL at 12:22

## 2020-01-22 NOTE — OP NOTE
ORTHOPAEDIC OPERATIVE NOTE    Patient: Sergio Ferrera    YOB: 1985    Medical Record Number: [id number]    Attending Physician: Ernestine Tom,*    Primary Care Physician: Provider, No Known    Date of Service: 1/21/2020    Surgeon: Ernestine Tom MD        DATE OF PROCEDURE: 1/21/2020    Pre-op Diagnosis:   Closed bicondylar fracture of left tibial plateau status post external fixator placement    Post-Op Diagnosis Codes:   Closed bicondylar fracture of left tibial plateau [S82.142A]    PROCEDURE PERFORMED: OPEN REDUCTION AND INTERNAL FIXATION LEFT TIBIA PLATEAU FRACTURE, REMOVE EXTERNAL FIXATOR  utilizing Synthes anatomically contoured posteromedial locking plate.    Implant Name Type Inv. Item Serial No.  Lot No. LRB No. Used   PLT TIB LCP PROX PM 2H 3.5X79MM STRL - [id number] Implant PLT TIB LCP PROX PM 2H 3.5X79MM STRL  DEPUY [employer]ES [id number] Left 1   SCRW LUCINA S/TAP 3.5X46MM - [id number] Implant SCRW LUCINA S/TAP 3.5X46MM  DEPU[employer]  Left 1   SCRW LUCINA S/TAP 3.5X55MM - [id number] Implant SCRW LUCINA S/TAP 3.5X55MM  DEPUY [employer]ES  Left 1   SCRW LK S/TAP STRDRV 3.5X70MM - [id number] Implant SCRW LK S/TAP STRDRV 3.5X70MM  DEPUY [employer]  Left 11   SCRW LK S/TAP STRDRV 3.5X60MM - [id number] Implant SCRW LK S/TAP STRDRV 3.5X60MM  DEPUY SYN[employer]ES  Left 1   SCRW LK S/TAP STRDRV 3.5X34MM - [id number] Implant SCRW LK S/TAP STRDRV 3.5X34MM  DEPUY SYNTHES  Left 11       SURGEON: Ernestine Tom MD     ASSISTANT: Alexsander Beltran MD Fellow    ANESTHESIA: General with Block  Anesthesiologist: Abhijeet Spivey MD; Tolu Guevara MD; Tato Chang MD  CRNA: Griselda Huddleston CRNA    Estimated Blood Loss: 50 mL    Specimens: * No orders in the log *    COMPLICATIONS:  Nil.     DRAINS:  * No LDAs found *     INDICATIONS: The patient is a 34 y.o. male  who sustained an injury while skiing and was placed in an external fixator.  Patient  called my office and presented for further evaluation and management.  The patient sustained a comminuted proximal tibial plateau fracture Schatzker 6  with displacement. Patient was evaluated with a CT scan knee.  He had a large posterior medial fragment with a coronal split the anteromedial fragment was small and did not have any major articular surface associated with it.  There was a smaller posterior lateral coronal split.  Treatment options and alternatives were discussed in detail with the patient who is indicated for an open reduction and internal fixation . The likely risks and benefits of the procedure including but not limited to infection, DVT, pulmonary embolism, leg length discrepancy, malunion nonunion, possibility of injury to nerves or vessels, stiffness, arthritis, compartment syndrome,  possibility of periprosthetic fractures have been discussed in detail. Despite the risks involved, the patient and  family would like to proceed. Despite the risks involved, the patient elected to proceed and informed consent was obtained and the patient was scheduled for surgery. The patient was seen in the preoperative holding area and the operative site was marked.    He was advised low molecular weight heparin for DVT prophylaxis but he has not been using it preoperatively.    DESCRIPTION OF PROCEDURE:     The patient was transferred to [facility] operating room. Preoperative antibiotics in the form of  Kefzol  intravenously was infused prior to the incision  according to the SCIP protocol.  A surgical time out was done with the team and the correct patient, surgical side and site were identified.     The external fixator frame was removed and the pins were removed pin tracts were curetted and irrigated.  There was no sign of infection.    A well padded tourniquet was placed proximally on the thigh.After achieving adequate general anesthesia the patient was placed in his lateral decubitus  position, swimmer's position all bony prominences were padded well.  The  leg was prepped and draped in the usual sterile fashion.   The tourniquet was inflated to a pressure of 250 mm Hg.     A  roll of towels was utilized.      Skin incision was made along the popliteal crease and along the medial proximal third of the leg.  Skin and subcutaneous tenderness tissue were incised and the deep fascia was incised in line with the skin incision.  The medial border of the gastrocnemius muscle and its tendon were identified in the proximal portion of the leg and the muscle was retracted laterally and it was gently elevated off the proximal tibia and the capsule.  On the deeper plane.  I remained deeper to the popliteus muscle and the proximal tibial posterior surface was adequately exposed.  Retraction was done by using a blunt Pamela retractor with the knee flexed to facilitate retraction. Care was taken throughout the procedure to protect the neurovascular bundle.       The fracture site was identified.  There was a large postero medial tibial fracture fragment, which was  displaced, but the overall articular cartilage for the medial fragment was intact. The lateral tibial plateau was having intra-articular involvement with a small posterior articular depressed fragment.  This was visualized through the fracture site and it was elevated with a blunt elevator.  The reduction was found to be satisfactory under image intensifier control.  The fragment was temporarily held with the help of K wires.     I initially fixed the medial tibial plateau fragment to the shaft by using a anatomically contoured 3.5 mm LCP posterior medial  Proximal tibia  2 holes plate. The plate was positioned and the position was verified under image intensifier control.  Two  3.5 mmcortical screw was used in the oblong hole to buttress and stabilize the medial tibial fracture fragment.  The longer screw was utilized to bring the plate down to the  bone and to buttress the fragment.  Followed by this, 3 locking screws were placed into the proximal portion of the plate into the medial tibial metaphyseal area.  Followed by this, 2 locking screws were placed in the shaft portion of the plate.  The cortical screw was explanted.   1 of the locking screws was a long under the image intensifier and it was removed and replaced with a shorter screw.    Care was taken to avoid placement of any intra-articular  screws .  The overall reduction of the fracture, fixation of the fracture and position of the hardware was found to be satisfactory and stable.  The knee was ranged and range of motion was found to be satisfactory from 0-90°. The knee was stable for varus valgus stress.        The bone quality was good and there was no necessity for augmentation with bone void fillers.     The sponge and needle count was found to be correct. The incisions were thoroughly irrigated with saline and the incisions were closed in layers sterile dressings were placed and the patient was transferred to the recovery room in a stable condition. The patient had adequate distal pulses and good capillary refill.  The compartments were soft. A knee immobilizer was given.      I plan to start  Lovenox 40 mg subcutaneous daily starting on postoperative day #1 for DVT prophylaxis.  Also patient will receive 2 more doses of  Kefzol   for antibiotic prophylaxis .   We will assess mobility with physical therapy and make plans accordingly for discharge.    I discussed the satisfactory performance of the procedure with the patient's family and discussed with them The postoperative management.      Ernestine Tom M.D.    1/21/2020    CC: Provider, No Known

## 2020-01-22 NOTE — PLAN OF CARE
Problem: Patient Care Overview  Goal: Plan of Care Review  Flowsheets (Taken 1/22/2020 2948)  Outcome Summary: POD 1, L ORIF, removed ex fix.  Pt reports accident from skiin in [place]; has had ex fix  for two wks, NWB LLE since then.  Today pt demonstrates ability to ambulate 150' w/ contact ot stand by assist using crutches, maintaining NWB LLE.  VC to improve gai tpattern.  Ascend/descended 4 steps w/ demo, education, and frequent VC.  DC PT - no equipment needs.  Pt aware and agreeable w/ plan.

## 2020-01-22 NOTE — PLAN OF CARE
Problem: Patient Care Overview  Goal: Plan of Care Review  Outcome: Ongoing (interventions implemented as appropriate)  Flowsheets (Taken 1/22/2020 0150)  Progress: improving  Plan of Care Reviewed With: patient  Outcome Summary: arrived to floor at 2300; POD 0 Lt tibial plateau ORIF. VSS, pain well controlled with prn percocet, denies any PONV at this time. voiding spontaneously per urinal, NWB status maintaind to LLE along with knee immobilizer. discussed plan of care, verbalized understanding. plans to d/c home 1/22. will continue to monitor.  Goal: Individualization and Mutuality  Outcome: Ongoing (interventions implemented as appropriate)  Goal: Discharge Needs Assessment  Outcome: Ongoing (interventions implemented as appropriate)  Goal: Interprofessional Rounds/Family Conf  Outcome: Ongoing (interventions implemented as appropriate)     Problem: Fall Risk (Adult)  Goal: Absence of Fall  Outcome: Ongoing (interventions implemented as appropriate)  Flowsheets (Taken 1/22/2020 0150)  Absence of Fall: achieves outcome     Problem: Surgery Nonspecified (Adult)  Goal: Signs and Symptoms of Listed Potential Problems Will be Absent, Minimized or Managed (Surgery Nonspecified)  Outcome: Ongoing (interventions implemented as appropriate)  Flowsheets (Taken 1/22/2020 0150)  Problems Assessed (Surgery): all  Problems Present (Surgery): pain; postoperative nausea and vomiting  Goal: Anesthesia/Sedation Recovery  Outcome: Ongoing (interventions implemented as appropriate)  Flowsheets (Taken 1/22/2020 0150)  Anesthesia/Sedation Recovery: progressing toward baseline

## 2020-01-22 NOTE — ANESTHESIA POSTPROCEDURE EVALUATION
Patient: Sergio Ferrera    Procedure Summary     Date:  01/21/20 Room / Location:  [facility] OR [address] [place] [address] MAIN OR    Anesthesia Start:  1628 Anesthesia Stop:  1958    Procedure:  OPEN REDUCTION AND INTERNAL FIXATION LEFT TIBIA PLATEAU FRACTURE, REMOVE EXTERNAL FIXATOR (Left Leg Lower) Diagnosis:  Closed bicondylar fracture of left tibial plateau    Surgeon:  Ernestine Tom MD Provider:  Tato Chang MD    Anesthesia Type:  general with block ASA Status:  1          Anesthesia Type: general with block    Vitals  Vitals Value Taken Time   /90 1/21/2020  8:20 PM   Temp     Pulse 69 1/21/2020  8:35 PM   Resp     SpO2 98 % 1/21/2020  8:35 PM   Vitals shown include unvalidated device data.        Post Anesthesia Care and Evaluation    Patient location during evaluation: bedside  Patient participation: complete - patient participated  Level of consciousness: awake  Pain management: adequate  Airway patency: patent  Anesthetic complications: No anesthetic complications    Cardiovascular status: acceptable  Respiratory status: acceptable  Hydration status: acceptable

## 2020-01-22 NOTE — PROGRESS NOTES
Discharge Planning Assessment  [facility]     Patient Name: Sergio Ferrera  MRN: [id number]  Today's Date: 1/22/2020    Admit Date: 1/21/2020    Discharge Needs Assessment     Row Name 01/22/20 1012       Living Environment    Lives With  parent(s)    Current Living Arrangements  home/apartment/condo    Potentially Unsafe Housing Conditions  other (see comments) No concerns    Primary Care Provided by  self    Provides Primary Care For  no one    Family Caregiver if Needed  parent(s)    Quality of Family Relationships  helpful;involved;supportive    Able to Return to Prior Arrangements  yes       Resource/Environmental Concerns    Resource/Environmental Concerns  home accessibility    Home Accessibility Concerns  stairs to access bedroom or bathroom    Transportation Concerns  car, none       Transition Planning    Patient/Family Anticipates Transition to  home with family    Patient/Family Anticipated Services at Transition  home health care    Transportation Anticipated  family or friend will provide       Discharge Needs Assessment    Readmission Within the Last 30 Days  no previous admission in last 30 days    Concerns to be Addressed  discharge planning    Equipment Currently Used at Home  crutches, axillary    Anticipated Changes Related to Illness  none    Equipment Needed After Discharge  other (see comments) Patient unable to identify and stated he wanted to wait and have his father decide    Outpatient/Agency/Support Group Needs  homecare agency    Discharge Facility/Level of Care Needs  home with home health    Provided post acute provider list?  Yes CCP left list with patient, he requested his father look at the list and for him to make the decision    Post Acute Provider List  Home Health    Delivered To  Patient    Method of Delivery  In person        Discharge Plan     Row Name 01/22/20 1016       Plan    Plan  Home with     Provided post acute provider list?  Yes    Post Acute Provider List   Home Health    Post Acute Provider Quality & Resource List  N/A    Delivered To  Patient    Method of Delivery  In person    Patient/Family in Agreement with Plan  yes    Plan Comments  CCP met with patient at bedside. CCP role explained and discharge planning discussed. Face sheet verified. Patient does not have a PCP. Patient lives with his mother. Patient has stairs in his home to access his bedroom and states he only uses the stairs once a day. Patient currently uses crutches at home and is independent with his ADLs. Patient denies any home health or sub-acute rehab history. CCP discussed home health with the patient and left a provider list. Patient requested his father look at the list and help him make a decision. CCP will follow up after PT eval. Patient is enrolled in meds to beds. Patient denies any discharge needs. CCP to follow up after PT eval to determine if home health is needed. Anne Thomas CSW        Destination      Coordination has not been started for this encounter.      Durable Medical Equipment      Coordination has not been started for this encounter.      Dialysis/Infusion      Coordination has not been started for this encounter.      Home Medical Care      Coordination has not been started for this encounter.      Therapy      Coordination has not been started for this encounter.      Community Resources      Coordination has not been started for this encounter.          Demographic Summary     Row Name 01/22/20 1011       General Information    Admission Type  inpatient    Arrived From  home    Referral Source  admission list    Reason for Consult  discharge planning    Preferred Language  English     Used During This Interaction  no        Functional Status     Row Name 01/22/20 1011       Functional Status    Usual Activity Tolerance  good    Current Activity Tolerance  fair       Functional Status, IADL    Medications  independent    Meal Preparation  independent     Housekeeping  independent    Laundry  independent    Shopping  independent       Mental Status    General Appearance WDL  WDL       Mental Status Summary    Recent Changes in Mental Status/Cognitive Functioning  no changes        Psychosocial    No documentation.       Abuse/Neglect    No documentation.       Legal    No documentation.       Substance Abuse    No documentation.       Patient Forms    No documentation.           Anne Thomas

## 2020-01-22 NOTE — PROGRESS NOTES
Patient: Sergio Ferrera  YOB: 1985     Date of Admission: 1/21/2020 10:05 AM Medical Record Number: [id number]     Attending Physician: Ernestine Tom,Ary    Procedure(s):  OPEN REDUCTION AND INTERNAL FIXATION LEFT TIBIA PLATEAU FRACTURE, REMOVE EXTERNAL FIXATOR Post Operative Day Number: 1    Subjective : No new orthopaedic complaints     Pain Relief: some relief with present medication.     Systemic Complaints: No Complaints  Vitals:    01/21/20 2356 01/22/20 0300 01/22/20 0756 01/22/20 1133   BP: 128/72 102/62 106/67 102/52   BP Location: Left arm Left arm Left arm Left arm   Patient Position: Lying Lying Lying Lying   Pulse: 86 62 67 71   Resp: 16 16 16 16   Temp: 98.4 °F (36.9 °C) 97.9 °F (36.6 °C) 97.9 °F (36.6 °C) 99.4 °F (37.4 °C)   TempSrc: Oral Oral Oral Oral   SpO2: 96% 98% 97% 99%   Weight:       Height:           Physical Exam: 34 y.o. male    General Appearance:       Alert, cooperative, in no acute distress                  Extremities:    Dressing Clean, Dry and Intact         Incision healthy without signs or symptoms of infections         No clinical sign of DVT        Able to do good movements of digits    Pulses:   Pulses palpable and equal bilaterally           Diagnostic Tests:     Results from last 7 days   Lab Units 01/20/20  1622   WBC 10*3/mm3 6.38   HEMOGLOBIN g/dL 11.9*   HEMATOCRIT % 36.9*   PLATELETS 10*3/mm3 440     Results from last 7 days   Lab Units 01/20/20  1622   SODIUM mmol/L 135*   POTASSIUM mmol/L 4.1   CHLORIDE mmol/L 96*   CO2 mmol/L 26.7   BUN mg/dL 25*   CREATININE mg/dL 0.95   GLUCOSE mg/dL 90   CALCIUM mg/dL 9.0     Results from last 7 days   Lab Units 01/20/20  1622   INR  1.00   APTT seconds 31.4     No results found for: CRP  No results found for: SEDRATE  No results found for: URICACID  No results found for: CRYSTAL  Microbiology Results (last 10 days)     ** No results found for the last 240 hours. **        No radiology results for the  last 7 days          Current Medications:  Scheduled Meds:  enoxaparin 40 mg Subcutaneous Daily     Continuous Infusions:   PRN Meds:.•  bisacodyl  •  docusate sodium  •  HYDROmorphone **AND** naloxone  •  ondansetron **OR** ondansetron  •  oxyCODONE-acetaminophen  •  oxyCODONE-acetaminophen    Assessment:    Procedure(s):  OPEN REDUCTION AND INTERNAL FIXATION LEFT TIBIA PLATEAU FRACTURE, REMOVE EXTERNAL FIXATOR    Patient Active Problem List   Diagnosis   • Closed bicondylar fracture of left tibial plateau       PLAN:   Continues current post-op course  Anticoagulation: Lovenox started  Dressing Change prn  Mobilize with PT as tolerated per protocol    Weight Bearing: NWB  Discharge Plan: OK to plan for discharge in  today to home  from orthopadic perspective.      Ernestine Tom MD    Date: 1/22/2020    Time: 1:34 PM

## 2020-01-22 NOTE — DISCHARGE SUMMARY
DISCHARGE SUMMARY    Date of Discharge:      Discharge Diagnosis:  Closed bicondylar fracture of left tibial plateau [S82.142A]  Closed bicondylar fracture of left tibial plateau [S82.142A]    DETAILS OF HOSPITAL STAY     Presenting Problem: Closed bicondylar fracture of left tibial plateau [S82.142A]  Closed bicondylar fracture of left tibial plateau [S82.142A]    Hospital Course/History of Present Illness: Patient is a 34 y.o. male presented with Closed bicondylar fracture of left tibial plateau [S82.142A]  Closed bicondylar fracture of left tibial plateau [S82.142A]. The patient did well postoperatively. At the time of discharge, the patient was afebrile, tolerating a regular diet, having normal bowel function, and adequate pain control on p.o. pain medication only.       Procedures Performed  Procedure(s):  OPEN REDUCTION AND INTERNAL FIXATION LEFT TIBIA PLATEAU FRACTURE, REMOVE EXTERNAL FIXATOR       Consults:   Consulting Physician(s)             None            Pertinent Test Results:     No visits with results within 2 Day(s) from this visit.   Latest known visit with results is:   Appointment on 01/20/2020   Component Date Value Ref Range Status   • PTT 01/20/2020 31.4  22.7 - 35.4 seconds Final   • Protime 01/20/2020 13.0  11.7 - 14.2 Seconds Final   • INR 01/20/2020 1.00  0.90 - 1.10 Final   • Glucose 01/20/2020 90  65 - 99 mg/dL Final   • BUN 01/20/2020 25* 6 - 20 mg/dL Final   • Creatinine 01/20/2020 0.95  0.76 - 1.27 mg/dL Final   • Sodium 01/20/2020 135* 136 - 145 mmol/L Final   • Potassium 01/20/2020 4.1  3.5 - 5.2 mmol/L Final   • Chloride 01/20/2020 96* 98 - 107 mmol/L Final   • CO2 01/20/2020 26.7  22.0 - 29.0 mmol/L Final   • Calcium 01/20/2020 9.0  8.6 - 10.5 mg/dL Final   • Total Protein 01/20/2020 7.2  6.0 - 8.5 g/dL Final   • Albumin 01/20/2020 3.90  3.50 - 5.20 g/dL Final   • ALT (SGPT) 01/20/2020 10  1 - 41 U/L Final   • AST (SGOT) 01/20/2020 17  1 - 40 U/L Final   • Alkaline  Phosphatase 01/20/2020 63  39 - 117 U/L Final   • Total Bilirubin 01/20/2020 0.2  0.2 - 1.2 mg/dL Final   • eGFR Non African Amer 01/20/2020 91  >60 mL/min/1.73 Final   • Globulin 01/20/2020 3.3  gm/dL Final   • A/G Ratio 01/20/2020 1.2  g/dL Final   • BUN/Creatinine Ratio 01/20/2020 26.3* 7.0 - 25.0 Final   • Anion Gap 01/20/2020 12.3  5.0 - 15.0 mmol/L Final   • WBC 01/20/2020 6.38  3.40 - 10.80 10*3/mm3 Final   • RBC 01/20/2020 4.93  4.14 - 5.80 10*6/mm3 Final   • Hemoglobin 01/20/2020 11.9* 13.0 - 17.7 g/dL Final   • Hematocrit 01/20/2020 36.9* 37.5 - 51.0 % Final   • MCV 01/20/2020 74.8* 79.0 - 97.0 fL Final   • MCH 01/20/2020 24.1* 26.6 - 33.0 pg Final   • MCHC 01/20/2020 32.2  31.5 - 35.7 g/dL Final   • RDW 01/20/2020 14.4  12.3 - 15.4 % Final   • RDW-SD 01/20/2020 38.5  37.0 - 54.0 fl Final   • MPV 01/20/2020 8.7  6.0 - 12.0 fL Final   • Platelets 01/20/2020 440  140 - 450 10*3/mm3 Final   • Neutrophil % 01/20/2020 67.3  42.7 - 76.0 % Final   • Lymphocyte % 01/20/2020 22.4  19.6 - 45.3 % Final   • Monocyte % 01/20/2020 5.1  5.0 - 12.0 % Final   • Eosinophil % 01/20/2020 4.1  0.3 - 6.2 % Final   • Basophil % 01/20/2020 1.0  0.0 - 1.5 % Final   • Neutrophils Absolute 01/20/2020 4.29  1.70 - 7.00 10*3/mm3 Final   • Lymphocytes Absolute 01/20/2020 1.43  0.70 - 3.10 10*3/mm3 Final   • Monocytes Absolute 01/20/2020 0.33  0.10 - 0.90 10*3/mm3 Final   • Eosinophils Absolute 01/20/2020 0.26  0.00 - 0.40 10*3/mm3 Final   • Basophils Absolute 01/20/2020 0.06  0.00 - 0.20 10*3/mm3 Final   • Anisocytosis 01/20/2020 Slight/1+  None Seen Final   • Microcytes 01/20/2020 Slight/1+  None Seen Final   • WBC Morphology 01/20/2020 Normal  Normal Final   • Platelet Morphology 01/20/2020 Normal  Normal Final   ; No results found.    Condition on Discharge:  Stable. The patient is awake, alert and oriented.  The incision is healthy.  No clinical signs of DVT.      Vital Signs  Temp:  [97.6 °F (36.4 °C)-99.4 °F (37.4 °C)] 99.4 °F  "(37.4 °C)  Heart Rate:  [] 71  Resp:  [16-20] 16  BP: (102-174)/() 102/52    Flowsheet Rows      First Filed Value   Admission Height  182.9 cm (72\") Documented at 01/21/2020 1040   Admission Weight  74.8 kg (165 lb) Documented at 01/21/2020 1040            DISCHARGE DISPOSITION AND PLAN:  The  Patient is being discharged home .    Discharge Medications     Discharge Medications      New Medications      Instructions Start Date   enoxaparin 40 MG/0.4ML solution syringe  Commonly known as:  LOVENOX   40 mg, Subcutaneous, Daily   Start Date:  January 23, 2020        Continue These Medications      Instructions Start Date   acetaminophen 500 MG tablet  Commonly known as:  TYLENOL   500 mg, Oral, Every 6 Hours PRN      CBD oral oil  Commonly known as:  cannabidiol   1 drop, Oral, 2 Times Daily      ibuprofen 200 MG tablet  Commonly known as:  ADVIL,MOTRIN   200 mg, Oral, Every 6 Hours PRN      oxyCODONE-acetaminophen 5-325 MG per tablet  Commonly known as:  PERCOCET   1 tablet, Oral, Every 6 Hours PRN             Discharge Diet: Regular    Activity at Discharge: as tolerated    Follow-up Appointments   on Feb 10 ,2020. Please call [phone] for scheduling appointment.        Ernestine Tom MD  01/22/20  1:37 PM    CC: Provider, No Known; Ernestine Tom MD   Consults     No orders found for last 30 day(s).       Ernestine Tom,*                           "

## 2020-01-22 NOTE — PROGRESS NOTES
Continued Stay Note  [facility]     Patient Name: Sergio Ferrera  MRN: [id number]  Today's Date: 1/22/2020    Admit Date: 1/21/2020    Discharge Plan     Row Name 01/22/20 1439       Plan    Plan  Home with assist     Plan Comments  CCP met with patient at bedside. Patient does not feel like he needs home health and states he has assistance at home if needed. Pat HOLCOMB         Discharge Codes    No documentation.       Expected Discharge Date and Time     Expected Discharge Date Expected Discharge Time    Jan 22, 2020             ZHANG Wells

## 2020-01-22 NOTE — THERAPY DISCHARGE NOTE
Patient Name: Sergio Ferrera  : 1985    MRN: [id number]                              Today's Date: 2020       Admit Date: 2020    Visit Dx:     ICD-10-CM ICD-9-CM   1. Closed bicondylar fracture of left tibial plateau S82.142A 823.00     Patient Active Problem List   Diagnosis   • Closed bicondylar fracture of left tibial plateau     Past Medical History:   Diagnosis Date   • Wrist fracture     right/ childhood     Past Surgical History:   Procedure Laterality Date   • EXTERNAL FIXATION TIBIAL FRACTURE       General Information     Row Name 20 1344          PT Evaluation Time/Intention    Document Type  evaluation;discharge treatment  -AR     Mode of Treatment  physical therapy  -AR     Row Name 20 1344          General Information    Patient Profile Reviewed?  yes  -AR     Prior Level of Function  min assist: has been NWB LLE w/ ex fix for two wks using crutches w/ assist  -AR     Existing Precautions/Restrictions  fall NWB LLE, KI on at all times  -AR     Row Name 20 1344          Relationship/Environment    Lives With  parent(s)  -AR     Row Name 20 1344          Resource/Environmental Concerns    Current Living Arrangements  home/apartment/condo  -AR     Row Name 20 1344          Stairs Within Home, Primary    Stairs, Within Home, Primary  few steps w/ HR to enter  -AR     Stairs Comment, Within Home, Primary  flight of steps to bedroom w/ HR  -AR     Row Name 20 1344          Cognitive Assessment/Intervention- PT/OT    Orientation Status (Cognition)  oriented x 4  -AR       User Key  (r) = Recorded By, (t) = Taken By, (c) = Cosigned By    Initials Name Provider Type    AR Dahlia Nichole PT Physical Therapist        Mobility     Row Name 20 1345          Bed Mobility Assessment/Treatment    Bed Mobility Assessment/Treatment  supine-sit;sit-supine  -AR     Supine-Sit [place] (Bed Mobility)  conditional independence  -AR     Sit-Supine  [place] (Bed Mobility)  conditional independence  -AR     Row Name 01/22/20 1345          Sit-Stand Transfer    Sit-Stand [place] (Transfers)  stand by assist  -AR     Assistive Device (Sit-Stand Transfers)  walker, front-wheeled  -AR     Row Name 01/22/20 1345          Gait/Stairs Assessment/Training    Gait/Stairs Assessment/Training  gait/ambulation independence  -AR     [place] Level (Gait)  stand by assist;contact guard  -AR     Assistive Device (Gait)  crutches, axillary  -AR     Distance in Feet (Gait)  150, able to maintain NWB LLE, swing to  -AR     Pattern (Gait)  swing-to  -AR     Deviations/Abnormal Patterns (Gait)  antalgic;ryan decreased  -AR     Bilateral Gait Deviations  heel strike decreased  -AR     Negotiation (Stairs)  stairs independence  -AR     [place] Level (Stairs)  contact guard;verbal cues  -AR     Assistive Device (Stairs)  crutches, axillary  -AR     Handrail Location (Stairs)  left side (ascending)  -AR     Number of Steps (Stairs)  4 steps w/ education and demonstation; impulsive, frequent cues to follow instructions but no safety concerns  -AR     Ascending Technique (Stairs)  step-to-step  -AR     Descending Technique (Stairs)  step-to-step  -AR       User Key  (r) = Recorded By, (t) = Taken By, (c) = Cosigned By    Initials Name Provider Type    Dahlia Cabrales PT Physical Therapist        Obj/Interventions     Row Name 01/22/20 1347          General ROM    GENERAL ROM COMMENTS  WFL except LLE  -AR     Row Name 01/22/20 1347          MMT (Manual Muscle Testing)    General MMT Comments  WFL except LLE not tested  -AR     Row Name 01/22/20 1347          Therapeutic Exercise    Sets/Reps (Therapeutic Exercise)  eduation on AP, quad sets and glute sets 10x  -AR     Row Name 01/22/20 1347          Static Sitting Balance    Level of [place] (Unsupported Sitting, Static Balance)  independent  -AR     Sitting Position (Unsupported Sitting, Static Balance)   sitting on edge of bed  -AR     Row Name 01/22/20 1347          Dynamic Sitting Balance    Level of [place], Reaches Outside Midline (Sitting, Dynamic Balance)  independent  -AR     Row Name 01/22/20 1347          Static Standing Balance    Level of [place] (Supported Standing, Static Balance)  standby assist  -AR     Assistive Device Utilized (Supported Standing, Static Balance)  cruthces, axillary  -AR     Row Name 01/22/20 1347          Dynamic Standing Balance    Level of [place], Reaches Outside Midline (Standing, Dynamic Balance)  contact guard assist;standby assist  -AR     Assistive Device Utilized (Supported Standing, Dynamic Balance)  crutches, axillary  -AR       User Key  (r) = Recorded By, (t) = Taken By, (c) = Cosigned By    Initials Name Provider Type    Dahlia Cabrales, PT Physical Therapist        Goals/Plan    No documentation.       Clinical Impression     Row Name 01/22/20 1347          Pain Assessment    Additional Documentation  Pain Scale: Numbers Pre/Post-Treatment (Group)  -AR     Row Name 01/22/20 1347          Pain Scale: Numbers Pre/Post-Treatment    Pain Scale: Numbers, Pretreatment  4/10  -AR     Pain Scale: Numbers, Post-Treatment  5/10  -AR     Pain Location - Side  Left  -AR     Pain Location - Orientation  lower  -AR     Pain Location  extremity  -AR     Pain Intervention(s)  Medication (See MAR);Cold applied;Repositioned  -AR     Row Name 01/22/20 7257          Plan of Care Review    Outcome Summary  POD 1, L ORIF, removed ex fix.  Pt reports accident from skiin in [place]; has had ex fix  for two wks, NWB LLE since then.  Today pt demonstrates ability to ambulate 150' w/ contact ot stand by assist using crutches, maintaining NWB LLE.  VC to improve gai tpattern.  Ascend/descended 4 steps w/ demo, education, and frequent VC.  DC PT - no equipment needs.  Pt aware and agreeable w/ plan.   -AR     Row Name 01/22/20 9703          Physical Therapy Clinical Impression     Patient/Family Goals Statement (PT Clinical Impression)  DC home today  -AR     Criteria for Skilled Interventions Met (PT Clinical Impression)  no  -AR     Row Name 01/22/20 1347          Vital Signs    O2 Delivery Pre Treatment  room air  -AR     O2 Delivery Intra Treatment  room air  -AR     O2 Delivery Post Treatment  room air  -AR     Row Name 01/22/20 1347          Positioning and Restraints    Pre-Treatment Position  in bed  -AR     Post Treatment Position  bed  -AR     In Bed  notified nsg;supine;call light within reach;exit alarm on;encouraged to call for assist  -AR       User Key  (r) = Recorded By, (t) = Taken By, (c) = Cosigned By    Initials Name Provider Type    AR Dahlia Nichole, PT Physical Therapist        Outcome Measures     Row Name 01/22/20 1349          How much help from another person do you currently need...    Turning from your back to your side while in flat bed without using bedrails?  4  -AR     Moving from lying on back to sitting on the side of a flat bed without bedrails?  4  -AR     Moving to and from a bed to a chair (including a wheelchair)?  4  -AR     Standing up from a chair using your arms (e.g., wheelchair, bedside chair)?  3  -AR     Climbing 3-5 steps with a railing?  3  -AR     To walk in hospital room?  3  -AR     AM-PAC 6 Clicks Score (PT)  21  -AR     Row Name 01/22/20 1349          Functional Assessment    Outcome Measure Options  AM-PAC 6 Clicks Basic Mobility (PT)  -AR       User Key  (r) = Recorded By, (t) = Taken By, (c) = Cosigned By    Initials Name Provider Type    Dahlia Cabrales PT Physical Therapist        Physical Therapy Education                 Title: PT OT SLP Therapies (Done)     Topic: Physical Therapy (Done)     Point: Mobility training (Done)     Description:   Instruct learner(s) on safety and technique for assisting patient out of bed, chair or wheelchair.  Instruct in the proper use of assistive devices, such as walker, crutches, cane or  brace.              Patient Friendly Description:   It's important to get you on your feet again, but we need to do so in a way that is safe for you. Falling has serious consequences, and your personal safety is the most important thing of all.        When it's time to get out of bed, one of us or a family member will sit next to you on the bed to give you support.     If your doctor or nurse tells you to use a walker, crutches, a cane, or a brace, be sure you use it every time you get out of bed, even if you think you don't need it.    Learning Progress Summary           Patient Acceptance, E,TB,D, VU,DU by AR at 1/22/2020 1349                   Point: Home exercise program (Done)     Description:   Instruct learner(s) on appropriate technique for monitoring, assisting and/or progressing patient with therapeutic exercises and activities.              Learning Progress Summary           Patient Acceptance, E,TB,D, VU,DU by AR at 1/22/2020 1349                   Point: Body mechanics (Done)     Description:   Instruct learner(s) on proper positioning and spine alignment for patient and/or caregiver during mobility tasks and/or exercises.              Learning Progress Summary           Patient Acceptance, E,TB,D, VU,DU by AR at 1/22/2020 1349                   Point: Precautions (Done)     Description:   Instruct learner(s) on prescribed precautions during mobility and gait tasks              Learning Progress Summary           Patient Acceptance, E,TB,D, VU,DU by AR at 1/22/2020 1349                               User Key     Initials Effective Dates Name Provider Type Discipline    AR 04/03/18 -  Dahlia Nichole, PT Physical Therapist PT              PT Recommendation and Plan     Outcome Summary/Treatment Plan (PT)  Anticipated Discharge Disposition (PT): home with assist  Outcome Summary: POD 1, L ORIF, removed ex fix.  Pt reports accident from skiin in [place]; has had ex fix  for two wks, NWB LLE since then.  Today  pt demonstrates ability to ambulate 150' w/ contact ot stand by assist using crutches, maintaining NWB LLE.  VC to improve gai tpattern.  Ascend/descended 4 steps w/ demo, education, and frequent VC.  DC PT - no equipment needs.  Pt aware and agreeable w/ plan.      Time Calculation:   PT Charges     Row Name 01/22/20 1344             Time Calculation    Start Time  1144  -AR      Stop Time  1226  -AR      Time Calculation (min)  42 min  -AR      PT Received On  01/22/20  -AR        User Key  (r) = Recorded By, (t) = Taken By, (c) = Cosigned By    Initials Name Provider Type    AR Dahlia Nichole, PT Physical Therapist        Therapy Charges for Today     Code Description Service Date Service Provider Modifiers Qty    15268700503 HC PT EVAL LOW COMPLEXITY 3 1/22/2020 Dahlia Nichole, PT GP 1    47866235110 HC PT THER PROC EA 15 MIN 1/22/2020 Dahlia Nichole, PT GP 1          PT G-Codes  Outcome Measure Options: AM-PAC 6 Clicks Basic Mobility (PT)  AM-PAC 6 Clicks Score (PT): 21    PT Discharge Summary  Anticipated Discharge Disposition (PT): home with assist    Dahlia Nichole PT  1/22/2020

## 2020-01-23 NOTE — PROGRESS NOTES
Case Management Discharge Note      Final Note: DC home. Collette Stewart RN    Provided post acute provider list?: Yes  Post Acute Provider List: Home Health  Post Acute Provider Quality & Resource List: N/A  Delivered To: Patient  Method of Delivery: In person    Destination      No service has been selected for the patient.      Durable Medical Equipment      No service has been selected for the patient.      Dialysis/Infusion      No service has been selected for the patient.      Home Medical Care      No service has been selected for the patient.      Therapy      No service has been selected for the patient.      Community Resources      No service has been selected for the patient.        Transportation Services  Private: Car    Final Discharge Disposition Code: 01 - home or self-care

## 2020-01-23 NOTE — PAYOR COMM NOTE
"P: [phone]  F: [phone]    PENDING REF #[id number]    DISCHARGED.   AWAITING AUTH APPROVAL  THANK YOU        Jordan Ferrera (34 y.o. Male)     Date of Birth Social Security Number Address Home Phone MRN    1985  [id number] [facility] [address] [phone] [id number]    Buddhist Marital Status          Patient Refused Single       Admission Date Admission Type Admitting Provider Attending Provider Department, Room/Bed    1/21/20 Elective Ernestine Tom MD  [address], P791/1    Discharge Date Discharge Disposition Discharge Destination        1/22/2020 Home or Self Care              Attending Provider:  (none)   Allergies:  No Known Allergies    Isolation:  None   Infection:  None   Code Status:  Prior    Ht:  182.9 cm (72\")   Wt:  74.8 kg (165 lb)    Admission Cmt:  None   Principal Problem:  Closed bicondylar fracture of left tibial plateau [S82.142A]                 Active Insurance as of 1/21/2020     Primary Coverage     Payor Plan Insurance Group Employer/Plan Group    [employer] [id number]     Payor Plan Address Payor Plan Phone Number Payor Plan Fax Number Effective Dates    [address] [phone]  10/1/2015 - None Entered    [facility] [id number]       Subscriber Name Subscriber Birth Date Member ID       JORDAN FERRERA 1985 [id number]                 Emergency Contacts      (Rel.) Home Phone Work Phone Mobile Phone    MATTHIAS MONGE (Father) -- -- [phone]               Operative/Procedure Notes       Ernestine Tom MD at 01/21/20 1731  Version 1 of 1                                              ORTHOPAEDIC OPERATIVE NOTE    Patient: Jordan Ferrera    YOB: 1985    Medical Record Number: [id number]    Attending Physician: Ernestine Tom,*    Primary Care Physician: Provider, No Known    Date of Service: 1/21/2020    Surgeon: Ernestine Tom MD        DATE OF " PROCEDURE: 1/21/2020    Pre-op Diagnosis:   Closed bicondylar fracture of left tibial plateau status post external fixator placement    Post-Op Diagnosis Codes:   Closed bicondylar fracture of left tibial plateau [S82.142A]    PROCEDURE PERFORMED: OPEN REDUCTION AND INTERNAL FIXATION LEFT TIBIA PLATEAU FRACTURE, REMOVE EXTERNAL FIXATOR  utilizing Synthes anatomically contoured posteromedial locking plate.    Implant Name Type Inv. Item Serial No.  Lot No. LRB No. Used   PLT TIB LCP PROX PM 2H 3.5X79MM STRL - [id number] Implant PLT TIB LCP PROX PM 2H 3.5X79MM STRL  DEPUY SYNTHES [id number] Left 1   SCRW LUCINA S/TAP 3.5X46MM - [id number] Implant SCRW LUCINA S/TAP 3.5X46MM  DEPUY SYNTHES  Left 1   SCRW LUCINA S/TAP 3.5X55MM - [id number] Implant SCRW LUCINA S/TAP 3.5X55MM  DEPUY SYNTHES  Left 1   SCRW LK S/TAP STRDRV 3.5X70MM - [id number] Implant SCRW LK S/TAP STRDRV 3.5X70MM  DEPUY SYNTHES  Left 11   SCRW LK S/TAP STRDRV 3.5X60MM - [id number] Implant SCRW LK S/TAP STRDRV 3.5X60MM  DEPUY SYNTHES  Left 1   SCRW LK S/TAP STRDRV 3.5X34MM - [id number] Implant SCRW LK S/TAP STRDRV 3.5X34MM  DEPUY SYNTHES  Left 11       SURGEON: Ernestine Tom MD     ASSISTANT: Alexsander Beltran MD Fellow    ANESTHESIA: General with Block  Anesthesiologist: Abhijeet Spivey MD; Tolu Guevara MD; Tato Chang MD  CRNA: Griselda Huddleston CRNA    Estimated Blood Loss: 50 mL    Specimens: * No orders in the log *    COMPLICATIONS:  Nil.     DRAINS:  * No LDAs found *     INDICATIONS: The patient is a 34 y.o. male  who sustained an injury while skiing and was placed in an external fixator.  Patient called my office and presented for further evaluation and management.  The patient sustained a comminuted proximal tibial plateau fracture Schatzker 6  with displacement. Patient was evaluated with a CT scan knee.  He had a large posterior medial fragment with a coronal split the anteromedial fragment was small and did not have any  major articular surface associated with it.  There was a smaller posterior lateral coronal split.  Treatment options and alternatives were discussed in detail with the patient who is indicated for an open reduction and internal fixation . The likely risks and benefits of the procedure including but not limited to infection, DVT, pulmonary embolism, leg length discrepancy, malunion nonunion, possibility of injury to nerves or vessels, stiffness, arthritis, compartment syndrome,  possibility of periprosthetic fractures have been discussed in detail. Despite the risks involved, the patient and  family would like to proceed. Despite the risks involved, the patient elected to proceed and informed consent was obtained and the patient was scheduled for surgery. The patient was seen in the preoperative holding area and the operative site was marked.    He was advised low molecular weight heparin for DVT prophylaxis but he has not been using it preoperatively.    DESCRIPTION OF PROCEDURE:     The patient was transferred to [facility] operating room. Preoperative antibiotics in the form of  Kefzol  intravenously was infused prior to the incision  according to the SCIP protocol.  A surgical time out was done with the team and the correct patient, surgical side and site were identified.     The external fixator frame was removed and the pins were removed pin tracts were curetted and irrigated.  There was no sign of infection.    A well padded tourniquet was placed proximally on the thigh.After achieving adequate general anesthesia the patient was placed in his lateral decubitus position, swimmer's position all bony prominences were padded well.  The  leg was prepped and draped in the usual sterile fashion.   The tourniquet was inflated to a pressure of 250 mm Hg.     A  roll of towels was utilized.      Skin incision was made along the popliteal crease and along the medial proximal third of the leg.  Skin and  subcutaneous tenderness tissue were incised and the deep fascia was incised in line with the skin incision.  The medial border of the gastrocnemius muscle and its tendon were identified in the proximal portion of the leg and the muscle was retracted laterally and it was gently elevated off the proximal tibia and the capsule.  On the deeper plane.  I remained deeper to the popliteus muscle and the proximal tibial posterior surface was adequately exposed.  Retraction was done by using a blunt Pamela retractor with the knee flexed to facilitate retraction. Care was taken throughout the procedure to protect the neurovascular bundle.       The fracture site was identified.  There was a large postero medial tibial fracture fragment, which was  displaced, but the overall articular cartilage for the medial fragment was intact. The lateral tibial plateau was having intra-articular involvement with a small posterior articular depressed fragment.  This was visualized through the fracture site and it was elevated with a blunt elevator.  The reduction was found to be satisfactory under image intensifier control.  The fragment was temporarily held with the help of K wires.     I initially fixed the medial tibial plateau fragment to the shaft by using a anatomically contoured 3.5 mm LCP posterior medial  Proximal tibia  2 holes plate. The plate was positioned and the position was verified under image intensifier control.  Two  3.5 mmcortical screw was used in the oblong hole to buttress and stabilize the medial tibial fracture fragment.  The longer screw was utilized to bring the plate down to the bone and to buttress the fragment.  Followed by this, 3 locking screws were placed into the proximal portion of the plate into the medial tibial metaphyseal area.  Followed by this, 2 locking screws were placed in the shaft portion of the plate.  The cortical screw was explanted.   1 of the locking screws was a long under the image  intensifier and it was removed and replaced with a shorter screw.    Care was taken to avoid placement of any intra-articular  screws .  The overall reduction of the fracture, fixation of the fracture and position of the hardware was found to be satisfactory and stable.  The knee was ranged and range of motion was found to be satisfactory from 0-90°. The knee was stable for varus valgus stress.        The bone quality was good and there was no necessity for augmentation with bone void fillers.     The sponge and needle count was found to be correct. The incisions were thoroughly irrigated with saline and the incisions were closed in layers sterile dressings were placed and the patient was transferred to the recovery room in a stable condition. The patient had adequate distal pulses and good capillary refill.  The compartments were soft. A knee immobilizer was given.      I plan to start  Lovenox 40 mg subcutaneous daily starting on postoperative day #1 for DVT prophylaxis.  Also patient will receive 2 more doses of  Kefzol   for antibiotic prophylaxis .   We will assess mobility with physical therapy and make plans accordingly for discharge.    I discussed the satisfactory performance of the procedure with the patient's family and discussed with them The postoperative management.      Ernestine Tom M.D.    1/21/2020    CC: Provider, No Known                                Electronically signed by Ernestine Tom MD at 01/21/20 2016          Discharge Summary      Ernestine Tom MD at 01/22/20 1337                DISCHARGE SUMMARY    Date of Discharge:      Discharge Diagnosis:  Closed bicondylar fracture of left tibial plateau [S82.142A]  Closed bicondylar fracture of left tibial plateau [S82.142A]    DETAILS OF HOSPITAL STAY     Presenting Problem: Closed bicondylar fracture of left tibial plateau [S82.142A]  Closed bicondylar fracture of left tibial plateau [S82.142A]    [facility]  Course/History of Present Illness: Patient is a 34 y.o. male presented with Closed bicondylar fracture of left tibial plateau [S82.142A]  Closed bicondylar fracture of left tibial plateau [S82.142A]. The patient did well postoperatively. At the time of discharge, the patient was afebrile, tolerating a regular diet, having normal bowel function, and adequate pain control on p.o. pain medication only.       Procedures Performed  Procedure(s):  OPEN REDUCTION AND INTERNAL FIXATION LEFT TIBIA PLATEAU FRACTURE, REMOVE EXTERNAL FIXATOR       Consults:   Consulting Physician(s)             None            Pertinent Test Results:     No visits with results within 2 Day(s) from this visit.   Latest known visit with results is:   Appointment on 01/20/2020   Component Date Value Ref Range Status   • PTT 01/20/2020 31.4  22.7 - 35.4 seconds Final   • Protime 01/20/2020 13.0  11.7 - 14.2 Seconds Final   • INR 01/20/2020 1.00  0.90 - 1.10 Final   • Glucose 01/20/2020 90  65 - 99 mg/dL Final   • BUN 01/20/2020 25* 6 - 20 mg/dL Final   • Creatinine 01/20/2020 0.95  0.76 - 1.27 mg/dL Final   • Sodium 01/20/2020 135* 136 - 145 mmol/L Final   • Potassium 01/20/2020 4.1  3.5 - 5.2 mmol/L Final   • Chloride 01/20/2020 96* 98 - 107 mmol/L Final   • CO2 01/20/2020 26.7  22.0 - 29.0 mmol/L Final   • Calcium 01/20/2020 9.0  8.6 - 10.5 mg/dL Final   • Total Protein 01/20/2020 7.2  6.0 - 8.5 g/dL Final   • Albumin 01/20/2020 3.90  3.50 - 5.20 g/dL Final   • ALT (SGPT) 01/20/2020 10  1 - 41 U/L Final   • AST (SGOT) 01/20/2020 17  1 - 40 U/L Final   • Alkaline Phosphatase 01/20/2020 63  39 - 117 U/L Final   • Total Bilirubin 01/20/2020 0.2  0.2 - 1.2 mg/dL Final   • eGFR Non African Amer 01/20/2020 91  >60 mL/min/1.73 Final   • Globulin 01/20/2020 3.3  gm/dL Final   • A/G Ratio 01/20/2020 1.2  g/dL Final   • BUN/Creatinine Ratio 01/20/2020 26.3* 7.0 - 25.0 Final   • Anion Gap 01/20/2020 12.3  5.0 - 15.0 mmol/L Final   • WBC 01/20/2020 6.38   "3.40 - 10.80 10*3/mm3 Final   • RBC 01/20/2020 4.93  4.14 - 5.80 10*6/mm3 Final   • Hemoglobin 01/20/2020 11.9* 13.0 - 17.7 g/dL Final   • Hematocrit 01/20/2020 36.9* 37.5 - 51.0 % Final   • MCV 01/20/2020 74.8* 79.0 - 97.0 fL Final   • MCH 01/20/2020 24.1* 26.6 - 33.0 pg Final   • MCHC 01/20/2020 32.2  31.5 - 35.7 g/dL Final   • RDW 01/20/2020 14.4  12.3 - 15.4 % Final   • RDW-SD 01/20/2020 38.5  37.0 - 54.0 fl Final   • MPV 01/20/2020 8.7  6.0 - 12.0 fL Final   • Platelets 01/20/2020 440  140 - 450 10*3/mm3 Final   • Neutrophil % 01/20/2020 67.3  42.7 - 76.0 % Final   • Lymphocyte % 01/20/2020 22.4  19.6 - 45.3 % Final   • Monocyte % 01/20/2020 5.1  5.0 - 12.0 % Final   • Eosinophil % 01/20/2020 4.1  0.3 - 6.2 % Final   • Basophil % 01/20/2020 1.0  0.0 - 1.5 % Final   • Neutrophils Absolute 01/20/2020 4.29  1.70 - 7.00 10*3/mm3 Final   • Lymphocytes Absolute 01/20/2020 1.43  0.70 - 3.10 10*3/mm3 Final   • Monocytes Absolute 01/20/2020 0.33  0.10 - 0.90 10*3/mm3 Final   • Eosinophils Absolute 01/20/2020 0.26  0.00 - 0.40 10*3/mm3 Final   • Basophils Absolute 01/20/2020 0.06  0.00 - 0.20 10*3/mm3 Final   • Anisocytosis 01/20/2020 Slight/1+  None Seen Final   • Microcytes 01/20/2020 Slight/1+  None Seen Final   • WBC Morphology 01/20/2020 Normal  Normal Final   • Platelet Morphology 01/20/2020 Normal  Normal Final   ; No results found.    Condition on Discharge:  Stable. The patient is awake, alert and oriented.  The incision is healthy.  No clinical signs of DVT.      Vital Signs  Temp:  [97.6 °F (36.4 °C)-99.4 °F (37.4 °C)] 99.4 °F (37.4 °C)  Heart Rate:  [] 71  Resp:  [16-20] 16  BP: (102-174)/() 102/52    Flowsheet Rows      First Filed Value   Admission Height  182.9 cm (72\") Documented at 01/21/2020 1040   Admission Weight  74.8 kg (165 lb) Documented at 01/21/2020 1040            DISCHARGE DISPOSITION AND PLAN:  The  Patient is being discharged home .    Discharge Medications     Discharge " Medications      New Medications      Instructions Start Date   enoxaparin 40 MG/0.4ML solution syringe  Commonly known as:  LOVENOX   40 mg, Subcutaneous, Daily   Start Date:  January 23, 2020        Continue These Medications      Instructions Start Date   acetaminophen 500 MG tablet  Commonly known as:  TYLENOL   500 mg, Oral, Every 6 Hours PRN      CBD oral oil  Commonly known as:  cannabidiol   1 drop, Oral, 2 Times Daily      ibuprofen 200 MG tablet  Commonly known as:  ADVIL,MOTRIN   200 mg, Oral, Every 6 Hours PRN      oxyCODONE-acetaminophen 5-325 MG per tablet  Commonly known as:  PERCOCET   1 tablet, Oral, Every 6 Hours PRN             Discharge Diet: Regular    Activity at Discharge: as tolerated    Follow-up Appointments   on Feb 10 ,2020. Please call [phone] for scheduling appointment.        Ernestine Tom MD  01/22/20  1:37 PM    CC: Provider, No Known; Ernestine Tom MD   Consults     No orders found for last 30 day(s).       Ernestine Tom,*                             Electronically signed by Ernestine Tom MD at 01/22/20 2630

## 2020-03-19 ENCOUNTER — PREP FOR SURGERY (OUTPATIENT)
Dept: OTHER | Facility: HOSPITAL | Age: 35
End: 2020-03-19

## 2020-03-19 DIAGNOSIS — M25.662 STIFFNESS OF KNEE JOINT, LEFT: Primary | ICD-10-CM

## 2020-03-19 NOTE — H&P (VIEW-ONLY)
ELIAS ORTEGA had a left orif tibial plateau on 01/21/2020.  He is now 8 weeks status post operation, performed by JENNY GATES MD.    JORDAN comes in today for a post op visit.   The patient states that he is not currently in pain. He is not taking medication for pain.  He is NWB of the LLE with the help of crutches in HKB unlocked 0-70 degrees.   JORDAN denies having an extremity warm to the touch, swelling, calf pain, numbness/tingling, drainage, sweats, difficulty breathing/shortness of breath, a fever/chill and nausea and vomiting.   He has been working with outpatient physical therapy. He states the knee feels stiff and he does not feel as if his ROM is progressing well.   He is alone in the office today.  He has not been using any pain medication.  He has been using ibuprofen p.r.n.     Review of Systems:  Patient denies: Abdominal Pain, Bleeding, Chest Pain, Convulsions/Seizure, Decreased Motion, Depression, Difficulty Swallowing, Easy Bruisability, Emotional Disturbances, Eyes or Vision Problems, Fecal Incontinence, Fever/Chills, Headaches, Increased Thirst, Increased Hunger, Insomnia, Joint Pain, Nausea/Vomiting, Night Sweats, Poor Balance, Persistent Cough, Rash, Shortness of Breath, Shortness of Breath While Lying down, Skin Problems, Urinary Retention and Weakness.  Allergies:  * no known allergies  Medications:  percocet 7.5-325 mg oral tablet (oxycodone-acetaminophen) 1 po q 6hrs prn; route: oral  * toradol   * oxycodone andacetaminophen   ibuprofen capsule (ibuprofen caps)   tylenol capsule (acetaminophen caps)   Patient History of:  BLOOD CLOTS/EMBOLISM - NEGATIVE  Surgical History:  Negative  Known Family History of:  Negative  Past medical, social, family histories and ROS reviewed today with the patient and changes documented in the chart (03/19/2020).    Physical Exam  Height:  72 in.    Weight:  165.0 lbs.     BMI:  22.46    Mental/HEENT/Cardio/Skin  The patient's general appearance is  well developed, well nourished, no acute distress.  Orientation is alert and oriented x 3.  The patient's mood is normal.  A head exam reveals normocephalic/atraumatic.  An eye exam reveals pupils equal.  Pulmonary exam shows normal air exchange, no labored breathing, or shortness of breath.  A skin exam shows normal temperature and color in the area of examination.      Left Knee  Incision has healed. No signs or symptoms of infection. Minimal swelling. ROM approximately 3-60 degrees. Patient shows no signs of DVT.  The DP Pulse is 2+.  Capillary refill is normal. Some tenderness in the medial tibial plateau.          Imaging/Diagnostic Studies  X-rays of the Left Knee [standing AP;Lateral] were ordered and reviewed today.      X-rays of the right knee X-rays show the hardware is in good position. The fracture is showing good signs of healing.  fracture of the medial tibial plateau.    Impression  Left tibial plateau displaced fracture (IFU60-R16.142A)  Left knee stiffness (FYJ17-I22.662)    Plan  The fracture is showing good signs of healing, however I am concerned that his is only able to reach 60 degrees of flexion.  Options and alternatives were discussed with the patient.  We will schedule a manipulation under anesthesia for Tuesday March 24, 2020.   We will then continue outpatient physical therapy. Instructions given. I have called and spoken with his physical therapist. He agrees with this plan.  Ok for partial weightbearing 50% of the LLE.  Advance by 10% per week. Ok to push ROM.  Continue HKB unlocked at 0-90 degrees for ambualation. Ok to remove while at rest.   Continue crutches.  Likely.  Risks and benefits of the manipulation including but not limited to inability to improve range of motion, persistent stiffness, possibility of injury to ligaments/tendons, refracture have been discussed in detail.  Despite the risks involved, the patient would like to proceed.  I will also plan for an intra-articular  Depo-Medrol injection.  Follow-up postoperatively.  3 weeks

## 2020-03-19 NOTE — H&P
ELIAS ORTEGA had a left orif tibial plateau on 01/21/2020.  He is now 8 weeks status post operation, performed by JENNY GATES MD.    JORDAN comes in today for a post op visit.   The patient states that he is not currently in pain. He is not taking medication for pain.  He is NWB of the LLE with the help of crutches in HKB unlocked 0-70 degrees.   JORDAN denies having an extremity warm to the touch, swelling, calf pain, numbness/tingling, drainage, sweats, difficulty breathing/shortness of breath, a fever/chill and nausea and vomiting.   He has been working with outpatient physical therapy. He states the knee feels stiff and he does not feel as if his ROM is progressing well.   He is alone in the office today.  He has not been using any pain medication.  He has been using ibuprofen p.r.n.     Review of Systems:  Patient denies: Abdominal Pain, Bleeding, Chest Pain, Convulsions/Seizure, Decreased Motion, Depression, Difficulty Swallowing, Easy Bruisability, Emotional Disturbances, Eyes or Vision Problems, Fecal Incontinence, Fever/Chills, Headaches, Increased Thirst, Increased Hunger, Insomnia, Joint Pain, Nausea/Vomiting, Night Sweats, Poor Balance, Persistent Cough, Rash, Shortness of Breath, Shortness of Breath While Lying down, Skin Problems, Urinary Retention and Weakness.  Allergies:  * no known allergies  Medications:  percocet 7.5-325 mg oral tablet (oxycodone-acetaminophen) 1 po q 6hrs prn; route: oral  * toradol   * oxycodone andacetaminophen   ibuprofen capsule (ibuprofen caps)   tylenol capsule (acetaminophen caps)   Patient History of:  BLOOD CLOTS/EMBOLISM - NEGATIVE  Surgical History:  Negative  Known Family History of:  Negative  Past medical, social, family histories and ROS reviewed today with the patient and changes documented in the chart (03/19/2020).    Physical Exam  Height:  72 in.    Weight:  165.0 lbs.     BMI:  22.46    Mental/HEENT/Cardio/Skin  The patient's general appearance is  well developed, well nourished, no acute distress.  Orientation is alert and oriented x 3.  The patient's mood is normal.  A head exam reveals normocephalic/atraumatic.  An eye exam reveals pupils equal.  Pulmonary exam shows normal air exchange, no labored breathing, or shortness of breath.  A skin exam shows normal temperature and color in the area of examination.      Left Knee  Incision has healed. No signs or symptoms of infection. Minimal swelling. ROM approximately 3-60 degrees. Patient shows no signs of DVT.  The DP Pulse is 2+.  Capillary refill is normal. Some tenderness in the medial tibial plateau.          Imaging/Diagnostic Studies  X-rays of the Left Knee [standing AP;Lateral] were ordered and reviewed today.      X-rays of the right knee X-rays show the hardware is in good position. The fracture is showing good signs of healing.  fracture of the medial tibial plateau.    Impression  Left tibial plateau displaced fracture ([id number]-V70.142A)  Left knee stiffness (LOL08-I90.662)    Plan  The fracture is showing good signs of healing, however I am concerned that his is only able to reach 60 degrees of flexion.  Options and alternatives were discussed with the patient.  We will schedule a manipulation under anesthesia for Tuesday March 24, 2020.   We will then continue outpatient physical therapy. Instructions given. I have called and spoken with his physical therapist. He agrees with this plan.  Ok for partial weightbearing 50% of the LLE.  Advance by 10% per week. Ok to push ROM.  Continue HKB unlocked at 0-90 degrees for ambualation. Ok to remove while at rest.   Continue crutches.  Likely.  Risks and benefits of the manipulation including but not limited to inability to improve range of motion, persistent stiffness, possibility of injury to ligaments/tendons, refracture have been discussed in detail.  Despite the risks involved, the patient would like to proceed.  I will also plan for an intra-articular  Depo-Medrol injection.  Follow-up postoperatively.  3 weeks

## 2020-03-23 RX ORDER — NAPROXEN SODIUM 220 MG
220 TABLET ORAL 2 TIMES DAILY PRN
COMMUNITY
End: 2021-08-12

## 2020-03-23 NOTE — PRE-PROCEDURE INSTRUCTIONS
Called pt to review new visitor policy for tomorrow and pt state he was already aware.  Pt states father, Linn Ferrera will be his person of contact.  [phone].

## 2020-03-24 ENCOUNTER — HOSPITAL ENCOUNTER (OUTPATIENT)
Facility: HOSPITAL | Age: 35
Setting detail: HOSPITAL OUTPATIENT SURGERY
Discharge: HOME | End: 2020-03-24
Attending: ORTHOPAEDIC SURGERY | Admitting: ORTHOPAEDIC SURGERY

## 2020-03-24 ENCOUNTER — ANESTHESIA (OUTPATIENT)
Dept: PERIOP | Facility: HOSPITAL | Age: 35
End: 2020-03-24

## 2020-03-24 ENCOUNTER — ANESTHESIA EVENT (OUTPATIENT)
Dept: PERIOP | Facility: HOSPITAL | Age: 35
End: 2020-03-24

## 2020-03-24 ENCOUNTER — APPOINTMENT (OUTPATIENT)
Dept: GENERAL RADIOLOGY | Facility: HOSPITAL | Age: 35
End: 2020-03-24

## 2020-03-24 VITALS
DIASTOLIC BLOOD PRESSURE: 82 MMHG | HEIGHT: 72 IN | OXYGEN SATURATION: 95 % | HEART RATE: 63 BPM | TEMPERATURE: 97.7 F | RESPIRATION RATE: 18 BRPM | BODY MASS INDEX: 21.98 KG/M2 | WEIGHT: 162.26 LBS | SYSTOLIC BLOOD PRESSURE: 115 MMHG

## 2020-03-24 PROBLEM — M25.662 STIFFNESS OF KNEE JOINT, LEFT: Status: RESOLVED | Noted: 2020-03-19 | Resolved: 2020-03-24

## 2020-03-24 LAB
DEPRECATED RDW RBC AUTO: 37.7 FL (ref 37–54)
ERYTHROCYTE [DISTWIDTH] IN BLOOD BY AUTOMATED COUNT: 14.5 % (ref 12.3–15.4)
HCT VFR BLD AUTO: 41.6 % (ref 37.5–51)
HGB BLD-MCNC: 13.3 G/DL (ref 13–17.7)
MCH RBC QN AUTO: 23.5 PG (ref 26.6–33)
MCHC RBC AUTO-ENTMCNC: 32 G/DL (ref 31.5–35.7)
MCV RBC AUTO: 73.4 FL (ref 79–97)
PLATELET # BLD AUTO: 271 10*3/MM3 (ref 140–450)
PMV BLD AUTO: 9.9 FL (ref 6–12)
RBC # BLD AUTO: 5.67 10*6/MM3 (ref 4.14–5.8)
WBC NRBC COR # BLD: 4.32 10*3/MM3 (ref 3.4–10.8)

## 2020-03-24 PROCEDURE — 85027 COMPLETE CBC AUTOMATED: CPT | Performed by: ORTHOPAEDIC SURGERY

## 2020-03-24 PROCEDURE — 25010000002 ONDANSETRON PER 1 MG: Performed by: NURSE ANESTHETIST, CERTIFIED REGISTERED

## 2020-03-24 PROCEDURE — 25010000002 METHYLPREDNISOLONE PER 40 MG: Performed by: ORTHOPAEDIC SURGERY

## 2020-03-24 PROCEDURE — 73560 X-RAY EXAM OF KNEE 1 OR 2: CPT

## 2020-03-24 PROCEDURE — 25010000002 PROPOFOL 10 MG/ML EMULSION: Performed by: NURSE ANESTHETIST, CERTIFIED REGISTERED

## 2020-03-24 RX ORDER — HYDRALAZINE HYDROCHLORIDE 20 MG/ML
5 INJECTION INTRAMUSCULAR; INTRAVENOUS
Status: DISCONTINUED | OUTPATIENT
Start: 2020-03-24 | End: 2020-03-24 | Stop reason: HOSPADM

## 2020-03-24 RX ORDER — DIPHENHYDRAMINE HCL 25 MG
25 CAPSULE ORAL
Status: DISCONTINUED | OUTPATIENT
Start: 2020-03-24 | End: 2020-03-24 | Stop reason: HOSPADM

## 2020-03-24 RX ORDER — MIDAZOLAM HYDROCHLORIDE 1 MG/ML
2 INJECTION INTRAMUSCULAR; INTRAVENOUS
Status: DISCONTINUED | OUTPATIENT
Start: 2020-03-24 | End: 2020-03-24 | Stop reason: HOSPADM

## 2020-03-24 RX ORDER — ACETAMINOPHEN 325 MG/1
650 TABLET ORAL ONCE AS NEEDED
Status: DISCONTINUED | OUTPATIENT
Start: 2020-03-24 | End: 2020-03-24 | Stop reason: HOSPADM

## 2020-03-24 RX ORDER — DIPHENHYDRAMINE HYDROCHLORIDE 50 MG/ML
12.5 INJECTION INTRAMUSCULAR; INTRAVENOUS
Status: DISCONTINUED | OUTPATIENT
Start: 2020-03-24 | End: 2020-03-24 | Stop reason: HOSPADM

## 2020-03-24 RX ORDER — ACETAMINOPHEN 500 MG
500 TABLET ORAL ONCE
Status: COMPLETED | OUTPATIENT
Start: 2020-03-24 | End: 2020-03-24

## 2020-03-24 RX ORDER — ONDANSETRON 2 MG/ML
4 INJECTION INTRAMUSCULAR; INTRAVENOUS ONCE AS NEEDED
Status: DISCONTINUED | OUTPATIENT
Start: 2020-03-24 | End: 2020-03-24 | Stop reason: HOSPADM

## 2020-03-24 RX ORDER — HYDROMORPHONE HYDROCHLORIDE 1 MG/ML
0.5 INJECTION, SOLUTION INTRAMUSCULAR; INTRAVENOUS; SUBCUTANEOUS
Status: DISCONTINUED | OUTPATIENT
Start: 2020-03-24 | End: 2020-03-24 | Stop reason: HOSPADM

## 2020-03-24 RX ORDER — METHYLPREDNISOLONE ACETATE 40 MG/ML
INJECTION, SUSPENSION INTRA-ARTICULAR; INTRALESIONAL; INTRAMUSCULAR; SOFT TISSUE AS NEEDED
Status: DISCONTINUED | OUTPATIENT
Start: 2020-03-24 | End: 2020-03-24 | Stop reason: HOSPADM

## 2020-03-24 RX ORDER — ONDANSETRON 2 MG/ML
INJECTION INTRAMUSCULAR; INTRAVENOUS AS NEEDED
Status: DISCONTINUED | OUTPATIENT
Start: 2020-03-24 | End: 2020-03-24 | Stop reason: SURG

## 2020-03-24 RX ORDER — EPHEDRINE SULFATE 50 MG/ML
5 INJECTION, SOLUTION INTRAVENOUS ONCE AS NEEDED
Status: DISCONTINUED | OUTPATIENT
Start: 2020-03-24 | End: 2020-03-24 | Stop reason: HOSPADM

## 2020-03-24 RX ORDER — OXYCODONE AND ACETAMINOPHEN 7.5; 325 MG/1; MG/1
1 TABLET ORAL ONCE AS NEEDED
Status: COMPLETED | OUTPATIENT
Start: 2020-03-24 | End: 2020-03-24

## 2020-03-24 RX ORDER — HYDROCODONE BITARTRATE AND ACETAMINOPHEN 7.5; 325 MG/1; MG/1
1 TABLET ORAL ONCE AS NEEDED
Status: DISCONTINUED | OUTPATIENT
Start: 2020-03-24 | End: 2020-03-24 | Stop reason: HOSPADM

## 2020-03-24 RX ORDER — LIDOCAINE HYDROCHLORIDE 10 MG/ML
0.5 INJECTION, SOLUTION EPIDURAL; INFILTRATION; INTRACAUDAL; PERINEURAL ONCE AS NEEDED
Status: DISCONTINUED | OUTPATIENT
Start: 2020-03-24 | End: 2020-03-24 | Stop reason: HOSPADM

## 2020-03-24 RX ORDER — NALOXONE HCL 0.4 MG/ML
0.2 VIAL (ML) INJECTION AS NEEDED
Status: DISCONTINUED | OUTPATIENT
Start: 2020-03-24 | End: 2020-03-24 | Stop reason: HOSPADM

## 2020-03-24 RX ORDER — FLUMAZENIL 0.1 MG/ML
0.2 INJECTION INTRAVENOUS AS NEEDED
Status: DISCONTINUED | OUTPATIENT
Start: 2020-03-24 | End: 2020-03-24 | Stop reason: HOSPADM

## 2020-03-24 RX ORDER — MIDAZOLAM HYDROCHLORIDE 1 MG/ML
1 INJECTION INTRAMUSCULAR; INTRAVENOUS
Status: DISCONTINUED | OUTPATIENT
Start: 2020-03-24 | End: 2020-03-24 | Stop reason: HOSPADM

## 2020-03-24 RX ORDER — PROMETHAZINE HYDROCHLORIDE 25 MG/ML
6.25 INJECTION, SOLUTION INTRAMUSCULAR; INTRAVENOUS
Status: DISCONTINUED | OUTPATIENT
Start: 2020-03-24 | End: 2020-03-24 | Stop reason: HOSPADM

## 2020-03-24 RX ORDER — OXYCODONE HCL 10 MG/1
10 TABLET, FILM COATED, EXTENDED RELEASE ORAL ONCE
Status: COMPLETED | OUTPATIENT
Start: 2020-03-24 | End: 2020-03-24

## 2020-03-24 RX ORDER — PROMETHAZINE HYDROCHLORIDE 25 MG/1
25 SUPPOSITORY RECTAL ONCE AS NEEDED
Status: DISCONTINUED | OUTPATIENT
Start: 2020-03-24 | End: 2020-03-24 | Stop reason: HOSPADM

## 2020-03-24 RX ORDER — SODIUM CHLORIDE 0.9 % (FLUSH) 0.9 %
3 SYRINGE (ML) INJECTION EVERY 12 HOURS SCHEDULED
Status: DISCONTINUED | OUTPATIENT
Start: 2020-03-24 | End: 2020-03-24 | Stop reason: HOSPADM

## 2020-03-24 RX ORDER — FENTANYL CITRATE 50 UG/ML
50 INJECTION, SOLUTION INTRAMUSCULAR; INTRAVENOUS
Status: DISCONTINUED | OUTPATIENT
Start: 2020-03-24 | End: 2020-03-24 | Stop reason: HOSPADM

## 2020-03-24 RX ORDER — LIDOCAINE HYDROCHLORIDE 20 MG/ML
INJECTION, SOLUTION INFILTRATION; PERINEURAL AS NEEDED
Status: DISCONTINUED | OUTPATIENT
Start: 2020-03-24 | End: 2020-03-24 | Stop reason: SURG

## 2020-03-24 RX ORDER — BUPIVACAINE HYDROCHLORIDE 5 MG/ML
INJECTION, SOLUTION EPIDURAL; INTRACAUDAL AS NEEDED
Status: DISCONTINUED | OUTPATIENT
Start: 2020-03-24 | End: 2020-03-24 | Stop reason: HOSPADM

## 2020-03-24 RX ORDER — SODIUM CHLORIDE, SODIUM LACTATE, POTASSIUM CHLORIDE, CALCIUM CHLORIDE 600; 310; 30; 20 MG/100ML; MG/100ML; MG/100ML; MG/100ML
9 INJECTION, SOLUTION INTRAVENOUS CONTINUOUS
Status: DISCONTINUED | OUTPATIENT
Start: 2020-03-24 | End: 2020-03-24 | Stop reason: HOSPADM

## 2020-03-24 RX ORDER — PROMETHAZINE HYDROCHLORIDE 25 MG/1
25 TABLET ORAL ONCE AS NEEDED
Status: DISCONTINUED | OUTPATIENT
Start: 2020-03-24 | End: 2020-03-24 | Stop reason: HOSPADM

## 2020-03-24 RX ORDER — PROMETHAZINE HYDROCHLORIDE 25 MG/ML
12.5 INJECTION, SOLUTION INTRAMUSCULAR; INTRAVENOUS ONCE AS NEEDED
Status: DISCONTINUED | OUTPATIENT
Start: 2020-03-24 | End: 2020-03-24 | Stop reason: HOSPADM

## 2020-03-24 RX ORDER — PROPOFOL 10 MG/ML
VIAL (ML) INTRAVENOUS AS NEEDED
Status: DISCONTINUED | OUTPATIENT
Start: 2020-03-24 | End: 2020-03-24 | Stop reason: SURG

## 2020-03-24 RX ORDER — SODIUM CHLORIDE 0.9 % (FLUSH) 0.9 %
3-10 SYRINGE (ML) INJECTION AS NEEDED
Status: DISCONTINUED | OUTPATIENT
Start: 2020-03-24 | End: 2020-03-24 | Stop reason: HOSPADM

## 2020-03-24 RX ADMIN — SODIUM CHLORIDE, POTASSIUM CHLORIDE, SODIUM LACTATE AND CALCIUM CHLORIDE 9 ML/HR: 600; 310; 30; 20 INJECTION, SOLUTION INTRAVENOUS at 06:36

## 2020-03-24 RX ADMIN — ACETAMINOPHEN 500 MG: 500 TABLET, FILM COATED ORAL at 06:35

## 2020-03-24 RX ADMIN — PROPOFOL 100 MG: 10 INJECTION, EMULSION INTRAVENOUS at 07:22

## 2020-03-24 RX ADMIN — OXYCODONE HYDROCHLORIDE 10 MG: 10 TABLET, FILM COATED, EXTENDED RELEASE ORAL at 06:39

## 2020-03-24 RX ADMIN — OXYCODONE HYDROCHLORIDE AND ACETAMINOPHEN 1 TABLET: 7.5; 325 TABLET ORAL at 08:01

## 2020-03-24 RX ADMIN — PROPOFOL 150 MG: 10 INJECTION, EMULSION INTRAVENOUS at 07:18

## 2020-03-24 RX ADMIN — ACETAMINOPHEN 500 MG: 500 TABLET, FILM COATED ORAL at 06:37

## 2020-03-24 RX ADMIN — LIDOCAINE HYDROCHLORIDE 60 MG: 20 INJECTION, SOLUTION INFILTRATION; PERINEURAL at 07:18

## 2020-03-24 RX ADMIN — ONDANSETRON HYDROCHLORIDE 4 MG: 2 SOLUTION INTRAMUSCULAR; INTRAVENOUS at 07:24

## 2020-03-24 RX ADMIN — PROPOFOL 50 MG: 10 INJECTION, EMULSION INTRAVENOUS at 07:20

## 2020-03-24 RX ADMIN — PROPOFOL 50 MG: 10 INJECTION, EMULSION INTRAVENOUS at 07:26

## 2020-03-24 NOTE — ANESTHESIA PREPROCEDURE EVALUATION
Anesthesia Evaluation     Patient summary reviewed and Nursing notes reviewed   no history of anesthetic complications:  NPO Solid Status: > 8 hours  NPO Liquid Status: > 2 hours           Airway   Mallampati: II  TM distance: >3 FB  Neck ROM: full  No difficulty expected  Dental - normal exam     Pulmonary     breath sounds clear to auscultation  Cardiovascular   Exercise tolerance: good (4-7 METS)    Rhythm: regular  Rate: normal        Neuro/Psych  GI/Hepatic/Renal/Endo      Musculoskeletal         ROS comment: Decreased ROM knee  Abdominal     Abdomen: soft.   Substance History      OB/GYN          Other                        Anesthesia Plan    ASA 1     MAC     intravenous induction     Anesthetic plan, all risks, benefits, and alternatives have been provided, discussed and informed consent has been obtained with: patient.    Plan discussed with CRNA.

## 2020-03-24 NOTE — ANESTHESIA POSTPROCEDURE EVALUATION
"Patient: Sergio Ferrera    Procedure Summary     Date:  03/24/20 Room / Location:  [facility] OR [address] [place] [address] MAIN OR    Anesthesia Start:  0714 Anesthesia Stop:  0743    Procedures:       KNEE MANIPULATION Left knee injection (Left )      Left knee injection (Left ) Diagnosis:       Stiffness of knee joint, left      (Stiffness of knee joint, left [M25.662])    Surgeon:  Ernestine Tom MD Provider:  Tato Chang MD    Anesthesia Type:  MAC ASA Status:  1          Anesthesia Type: MAC    Vitals  Vitals Value Taken Time   /79 3/24/2020  7:45 AM   Temp 36.5 °C (97.7 °F) 3/24/2020  7:40 AM   Pulse 67 3/24/2020  7:45 AM   Resp 18 3/24/2020  7:45 AM   SpO2 95 % 3/24/2020  7:45 AM           Post Anesthesia Care and Evaluation    Patient location during evaluation: bedside  Patient participation: complete - patient participated  Level of consciousness: awake and alert  Pain management: adequate  Airway patency: patent  Anesthetic complications: No anesthetic complications    Cardiovascular status: acceptable  Respiratory status: acceptable  Hydration status: acceptable    Comments: /79   Pulse 67   Temp 36.5 °C (97.7 °F) (Oral)   Resp 18   Ht 182.9 cm (72\")   Wt 73.6 kg (162 lb 4.1 oz)   SpO2 95%   BMI 22.01 kg/m²       "

## 2020-03-24 NOTE — ANESTHESIA POSTPROCEDURE EVALUATION
"Patient: Sergio Ferrera    Procedure Summary     Date:  03/24/20 Room / Location:  [facility] OR [address] [place] [address] MAIN OR    Anesthesia Start:  0714 Anesthesia Stop:  0743    Procedures:       KNEE MANIPULATION Left knee injection (Left )      Left knee injection (Left ) Diagnosis:       Stiffness of knee joint, left      (Stiffness of knee joint, left [M25.662])    Surgeon:  Ernestine Tom MD Provider:  Tato Chang MD    Anesthesia Type:  MAC ASA Status:  1          Anesthesia Type: MAC    Vitals  Vitals Value Taken Time   /82 3/24/2020  8:15 AM   Temp 36.5 °C (97.7 °F) 3/24/2020  7:40 AM   Pulse 63 3/24/2020  8:15 AM   Resp 18 3/24/2020  8:15 AM   SpO2 95 % 3/24/2020  8:15 AM           Post Anesthesia Care and Evaluation    Patient location during evaluation: bedside  Patient participation: complete - patient participated  Level of consciousness: awake and alert  Pain management: adequate  Airway patency: patent  Anesthetic complications: No anesthetic complications    Cardiovascular status: acceptable  Respiratory status: acceptable  Hydration status: acceptable    Comments: /82   Pulse 63   Temp 36.5 °C (97.7 °F) (Oral)   Resp 18   Ht 182.9 cm (72\")   Wt 73.6 kg (162 lb 4.1 oz)   SpO2 95%   BMI 22.01 kg/m²       "
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
## 2021-08-12 ENCOUNTER — OFFICE VISIT (OUTPATIENT)
Dept: FAMILY MEDICINE CLINIC | Facility: CLINIC | Age: 36
End: 2021-08-12

## 2021-08-12 VITALS
TEMPERATURE: 98 F | HEIGHT: 72 IN | SYSTOLIC BLOOD PRESSURE: 124 MMHG | WEIGHT: 165 LBS | HEART RATE: 63 BPM | OXYGEN SATURATION: 99 % | DIASTOLIC BLOOD PRESSURE: 80 MMHG | BODY MASS INDEX: 22.35 KG/M2

## 2021-08-12 DIAGNOSIS — K40.91 UNILATERAL RECURRENT INGUINAL HERNIA WITHOUT OBSTRUCTION OR GANGRENE: Primary | ICD-10-CM

## 2021-08-12 PROCEDURE — 99203 OFFICE O/P NEW LOW 30 MIN: CPT | Performed by: NURSE PRACTITIONER

## 2021-08-12 RX ORDER — MELATONIN
1000 DAILY
COMMUNITY

## 2021-08-12 NOTE — PROGRESS NOTES
"Chief Complaint  Establish Care (new pt - nonfasting ) and Hernia (R upper groin swelling \"felt something come through\" feels \"organs push down\"  some discomfort worese w tight clothing)    Masks/face shield/appropriate PPE were worn for the entirety of the visit by the patient, MA, and provider.     Subjective          Sergio Ferrera presents to [facility] PRIMARY CARE  History of Present Illness  Sergio Ferrera is a 36 y.o. male presents to the clinic today to establish care. Patient has complaints of protrusion of right groin area.    Right groin protrusion: He has had the protrusion since 2014. This protrusion has gradually gotten more bothersome for him.  Patient is unsure if there was an initial injury or event that caused the protrusion.  He feels like there was a time when he sneezed really hard and then noted the protrusion.  He also pulled his groin around the same time he noted the protrusion.  Patient was also very physically active back in 2014 playing tennis.  He was working out frequently and working with a .  The  had him doing a lot of squats with very heavy weights.  The protrusion it is not present when he wakes up in the morning and is lying down, but is present when he stands up in the morning and goes about his day. When he stretches his lower back, it feels as if the protrusion improves. He states the area is painful at times, but sometimes it is not. When it is painful, he describes the pain as an ache, not a sharp pain. He rates it at a 4/10 on a scale of 0-10 at its worst.  He states the protrusion seems to be always there and may not be present about 5 to 10% of the time he is awake.      History of left tibial plateau: This happened in January 2020 while he was skiing in [place].  He was diagnosed with a closed fracture of left tibial plateau and underwent external fixator placement.  Patient underwent physical therapy and is doing " "well today.    Shoulder pain: This is reportedly chronic.  He had experienced shoulder pain related to using crutches frequently after his surgery.  He feels that this is also related to his posture as he was working long hours sitting at a desk.  He has seen by physical therapy in the past and that helped improve his shoulder pain.  He has been trying to do more yoga as well as stretching to improve his pain.     Review of Systems   Constitutional: Negative.    HENT: Negative.    Eyes: Negative.    Respiratory: Negative.    Cardiovascular: Negative.    Gastrointestinal: Positive for abdominal distention and abdominal pain.   Endocrine: Negative.    Genitourinary: Negative.    Musculoskeletal: Negative.    Skin: Negative.    Allergic/Immunologic: Negative.    Neurological: Negative.    Hematological: Negative.    Psychiatric/Behavioral: Negative.       Objective   Vital Signs:   /80 (BP Location: Right arm, Patient Position: Sitting)   Pulse 63   Temp 98 °F (36.7 °C)   Ht 182.9 cm (72\")   Wt 74.8 kg (165 lb)   SpO2 99%   BMI 22.38 kg/m²     Physical Exam  Constitutional:       General: He is not in acute distress.     Appearance: Normal appearance. He is normal weight. He is not ill-appearing, toxic-appearing or diaphoretic.   HENT:      Head: Normocephalic and atraumatic.   Cardiovascular:      Rate and Rhythm: Normal rate and regular rhythm.      Heart sounds: Normal heart sounds. No murmur heard.   No friction rub. No gallop.    Pulmonary:      Effort: Pulmonary effort is normal. No respiratory distress.      Breath sounds: No stridor. No wheezing or rhonchi.   Abdominal:      General: Bowel sounds are normal. There is no distension.      Palpations: Abdomen is soft.      Tenderness: There is no abdominal tenderness.      Hernia: A hernia (right inguinal) is present.   Musculoskeletal:         General: No swelling. Normal range of motion.      Cervical back: Normal range of motion and neck supple. No " rigidity.      Right lower leg: No edema.      Left lower leg: No edema.   Lymphadenopathy:      Cervical: No cervical adenopathy.   Skin:     General: Skin is warm.   Neurological:      General: No focal deficit present.      Mental Status: He is alert and oriented to person, place, and time.      Motor: No weakness.      Gait: Gait normal.   Psychiatric:         Mood and Affect: Mood normal.         Behavior: Behavior normal.        Result Review :{Labs  Result Review  Imaging  Med Tab  Media  Procedures :23}                 Assessment and Plan    Diagnoses and all orders for this visit:    1. Unilateral recurrent inguinal hernia without obstruction or gangrene (Primary)  -     Ambulatory Referral to General Surgery      Patient is a pleasant 36-year-old male.  Patient does appear to have presence of right inguinal hernia.  This has been present since 2014.  This has not been bothersome for the patient in the past, but is starting to be more bothersome and uncomfortable.  He is able to manually reduce the hernia.  He denies right groin pain with lifting and is able to perform normal activities of daily living.  As this has become more bothersome for the patient, I have referred him to general surgery for evaluation.  We discussed when to seek emergency care.  Patient should go to the ED if he experiences severe pain to his right groin area or inability to reduce the hernia.  Patient verbalized understanding.    Patient's is experiencing discomfort to his upper back. He is not interested in medication management of her symptoms.  We discussed continuing daily stretching as well as seeking out massage therapy.  Also discussed using heat, patient states when he does use a heating pad, his pain improves.      Follow Up   Return in about 6 weeks (around 9/23/2021) for Annual physical, early appt for fasting labs.     Patient was given instructions and counseling regarding his condition or for health maintenance  advice. Please see specific information pulled into the AVS if appropriate.     Electronically signed by CHRISTIAN Becker, 08/12/21, 4:04 PM EDT.

## 2021-09-07 ENCOUNTER — OFFICE VISIT (OUTPATIENT)
Dept: SURGERY | Facility: CLINIC | Age: 36
End: 2021-09-07

## 2021-09-07 ENCOUNTER — TELEPHONE (OUTPATIENT)
Dept: FAMILY MEDICINE CLINIC | Facility: CLINIC | Age: 36
End: 2021-09-07

## 2021-09-07 VITALS — BODY MASS INDEX: 21.91 KG/M2 | HEIGHT: 72 IN | WEIGHT: 161.8 LBS

## 2021-09-07 DIAGNOSIS — K40.90 RIGHT INGUINAL HERNIA: Primary | ICD-10-CM

## 2021-09-07 PROCEDURE — 99243 OFF/OP CNSLTJ NEW/EST LOW 30: CPT | Performed by: SURGERY

## 2021-09-07 RX ORDER — CEFAZOLIN SODIUM 2 G/100ML
2 INJECTION, SOLUTION INTRAVENOUS ONCE
Status: CANCELLED | OUTPATIENT
Start: 2021-10-04 | End: 2021-09-07
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
## 2021-09-07 NOTE — TELEPHONE ENCOUNTER
Caller: Sergio Ferrera    Relationship: Self    Best call back number: [phone]    Who are you requesting to speak with (clinical staff, provider,  specific staff member): NURSE    What was the call regarding: PATIENT MET WITH GENERAL SURGEON - DR. YEUNG. THEY ARE NEEDING THE PATIENT TO HAVE A COVID NASAL SWAB TEST PRE-SURGERY AND PATIENT DOES NOT WANT TO HAVE THAT TYPE OF TESTING DONE. HE WOULD PREFER THE THROAT SWAB AND NEEDS A NOTE FROM HIS PCP. PLEASE CALL .    Do you require a callback: YES

## 2021-09-10 ENCOUNTER — PATIENT ROUNDING (BHMG ONLY) (OUTPATIENT)
Dept: SURGERY | Facility: CLINIC | Age: 36
End: 2021-09-10

## 2021-09-10 NOTE — PROGRESS NOTES
September 10, 2021    Hello, may I speak with Sergio Ferrera?    My name is Janine Kim      I am  with [facility]  [address]  [place] [address] [phone].    Before we get started may I verify your date of birth? 1985    I am calling to officially welcome you to our practice and ask about your recent visit. Is this a good time to talk? No Voicemail Left    Tell me about your visit with us. What things went well?         We're always looking for ways to make our patients' experiences even better. Do you have recommendations on ways we may improve?      Overall were you satisfied with your first visit to our practice?        I appreciate you taking the time to speak with me today. Is there anything else I can do for you?       Thank you, and have a great day.

## 2021-09-21 ENCOUNTER — TELEPHONE (OUTPATIENT)
Dept: FAMILY MEDICINE CLINIC | Facility: CLINIC | Age: 36
End: 2021-09-21

## 2021-09-21 ENCOUNTER — TELEPHONE (OUTPATIENT)
Dept: SURGERY | Facility: CLINIC | Age: 36
End: 2021-09-21

## 2021-09-21 NOTE — TELEPHONE ENCOUNTER
Caller: Sergio Ferrera    Relationship: Self    Best call back number: [phone]     What is the best time to reach you: ANYTIME    Who are you requesting to speak with MA OR GABBY          What was the call regarding: PATIENT IS CALLING IN HE WILL NEED TO GET COVID TEST DONE BEFORE HIS SURGERY AND WOULD LIKE NOTE FROM GABBY TO EXEMPT HIM FORM NASAL OR THROAT SWAB SO HE CAN GET SPIT TEST DONE AS HE HAS ISSUES WITH DOING IT THE OTHER WAYS.    Do you require a callback: YES

## 2021-09-21 NOTE — TELEPHONE ENCOUNTER
Patient has surgery scheduled on 10/4 and PAT on 101/1. He has a note from his doctor is not have a nasal swab. Can only have blood draw or mouth swab. Patient is wanting to know if you can order this as he has to have a Covid test prior to his surgery

## 2021-09-23 NOTE — TELEPHONE ENCOUNTER
As of right now, COVID-19 testing nasal or oropharyngeal are available.    Electronically signed by CHRISTIAN Becker, 09/23/21, 2:30 PM EDT.

## 2021-10-01 ENCOUNTER — PRE-ADMISSION TESTING (OUTPATIENT)
Dept: PREADMISSION TESTING | Facility: HOSPITAL | Age: 36
End: 2021-10-01

## 2021-10-01 VITALS
HEIGHT: 72 IN | BODY MASS INDEX: 21.2 KG/M2 | WEIGHT: 156.5 LBS | SYSTOLIC BLOOD PRESSURE: 117 MMHG | DIASTOLIC BLOOD PRESSURE: 75 MMHG

## 2021-10-01 LAB
ANION GAP SERPL CALCULATED.3IONS-SCNC: 9.9 MMOL/L (ref 5–15)
BUN SERPL-MCNC: 10 MG/DL (ref 6–20)
BUN/CREAT SERPL: 9.4 (ref 7–25)
CALCIUM SPEC-SCNC: 9.4 MG/DL (ref 8.6–10.5)
CHLORIDE SERPL-SCNC: 101 MMOL/L (ref 98–107)
CO2 SERPL-SCNC: 26.1 MMOL/L (ref 22–29)
CREAT SERPL-MCNC: 1.06 MG/DL (ref 0.76–1.27)
DEPRECATED RDW RBC AUTO: 41.3 FL (ref 37–54)
ERYTHROCYTE [DISTWIDTH] IN BLOOD BY AUTOMATED COUNT: 15.7 % (ref 12.3–15.4)
GFR SERPL CREATININE-BSD FRML MDRD: 79 ML/MIN/1.73
GLUCOSE SERPL-MCNC: 83 MG/DL (ref 65–99)
HCT VFR BLD AUTO: 47.8 % (ref 37.5–51)
HGB BLD-MCNC: 14.7 G/DL (ref 13–17.7)
MCH RBC QN AUTO: 23.6 PG (ref 26.6–33)
MCHC RBC AUTO-ENTMCNC: 30.8 G/DL (ref 31.5–35.7)
MCV RBC AUTO: 76.7 FL (ref 79–97)
PLATELET # BLD AUTO: 258 10*3/MM3 (ref 140–450)
PMV BLD AUTO: 9.5 FL (ref 6–12)
POTASSIUM SERPL-SCNC: 4.7 MMOL/L (ref 3.5–5.2)
RBC # BLD AUTO: 6.23 10*6/MM3 (ref 4.14–5.8)
SARS-COV-2 ORF1AB RESP QL NAA+PROBE: NOT DETECTED
SODIUM SERPL-SCNC: 137 MMOL/L (ref 136–145)
WBC # BLD AUTO: 3.46 10*3/MM3 (ref 3.4–10.8)

## 2021-10-01 PROCEDURE — C9803 HOPD COVID-19 SPEC COLLECT: HCPCS | Performed by: NURSE PRACTITIONER

## 2021-10-01 PROCEDURE — 36415 COLL VENOUS BLD VENIPUNCTURE: CPT

## 2021-10-01 PROCEDURE — 85027 COMPLETE CBC AUTOMATED: CPT

## 2021-10-01 PROCEDURE — 80048 BASIC METABOLIC PNL TOTAL CA: CPT

## 2021-10-01 PROCEDURE — U0004 COV-19 TEST NON-CDC HGH THRU: HCPCS | Performed by: NURSE PRACTITIONER

## 2021-10-01 NOTE — DISCHARGE INSTRUCTIONS
Take the following medications the morning of surgery:  NONE    ARRIVE TO MAIN OR DESK 10-4-21 AT 5:30AM      If you are on prescription narcotic pain medication to control your pain you may also take that medication the morning of surgery.    General Instructions:  • Do not eat solid food after midnight the night before surgery.  • You may drink clear liquids day of surgery but must stop at least one hour before your hospital arrival time.  • It is beneficial for you to have a clear drink that contains carbohydrates the day of surgery.  We suggest a 12 to 20 ounce bottle of Gatorade or Powerade for non-diabetic patients or a 12 to 20 ounce bottle of G2 or Powerade Zero for diabetic patients. (Pediatric patients, are not advised to drink a 12 to 20 ounce carbohydrate drink)    Clear liquids are liquids you can see through.  Nothing red in color.     Plain water                               Sports drinks  Sodas                                   Gelatin (Jell-O)  Fruit juices without pulp such as white grape juice and apple juice  Popsicles that contain no fruit or yogurt  Tea or coffee (no cream or milk added)  Gatorade / Powerade  G2 / Powerade Zero    • Infants may have breast milk up to four hours before surgery.  • Infants drinking formula may drink formula up to six hours before surgery.   • Patients who avoid smoking, chewing tobacco and alcohol for 4 weeks prior to surgery have a reduced risk of post-operative complications.  Quit smoking as many days before surgery as you can.  • Do not smoke, use chewing tobacco or drink alcohol the day of surgery.   • If applicable bring your C-PAP/ BI-PAP machine.  • Bring any papers given to you in the doctor’s office.  • Wear clean comfortable clothes.  • Do not wear contact lenses, false eyelashes or make-up.  Bring a case for your glasses.   • Bring crutches or walker if applicable.  • Remove all piercings.  Leave jewelry and any other valuables at home.  • Hair  extensions with metal clips must be removed prior to surgery.  • The Pre-Admission Testing nurse will instruct you to bring medications if unable to obtain an accurate list in Pre-Admission Testing.        Preventing a Surgical Site Infection:  • For 2 to 3 days before surgery, avoid shaving with a razor because the razor can irritate skin and make it easier to develop an infection.    • Any areas of open skin can increase the risk of a post-operative wound infection by allowing bacteria to enter and travel throughout the body.  Notify your surgeon if you have any skin wounds / rashes even if it is not near the expected surgical site.  The area will need assessed to determine if surgery should be delayed until it is healed.  • The night prior to surgery shower using a fresh bar of anti-bacterial soap (such as Dial) and clean washcloth.  Sleep in a clean bed with clean clothing.  Do not allow pets to sleep with you.  • Shower on the morning of surgery using a fresh bar of anti-bacterial soap (such as Dial) and clean washcloth.  Dry with a clean towel and dress in clean clothing.  • Ask your surgeon if you will be receiving antibiotics prior to surgery.  • Make sure you, your family, and all healthcare providers clean their hands with soap and water or an alcohol based hand  before caring for you or your wound.    Day of surgery:  Your arrival time is approximately two hours before your scheduled surgery time.  Upon arrival, a Pre-op nurse and Anesthesiologist will review your health history, obtain vital signs, and answer questions you may have.  The only belongings needed at this time will be a list of your home medications and if applicable your C-PAP/BI-PAP machine.  A Pre-op nurse will start an IV and you may receive medication in preparation for surgery, including something to help you relax.     Please be aware that surgery does come with discomfort.  We want to make every effort to control your  discomfort so please discuss any uncontrolled symptoms with your nurse.   Your doctor will most likely have prescribed pain medications.      If you are going home after surgery you will receive individualized written care instructions before being discharged.  A responsible adult must drive you to and from the hospital on the day of your surgery and stay with you for 24 hours.  Discharge prescriptions can be filled by the hospital pharmacy during regular pharmacy hours.  If you are having surgery late in the day/evening your prescription may be e-prescribed to your pharmacy.  Please verify your pharmacy hours or chose a 24 hour pharmacy to avoid not having access to your prescription because your pharmacy has closed for the day.    If you are staying overnight following surgery, you will be transported to your hospital room following the recovery period.  [facility] has all private rooms.    If you have any questions please call Pre-Admission Testing at [phone].  Deductibles and co-payments are collected on the day of service. Please be prepared to pay the required co-pay, deductible or deposit on the day of service as defined by your plan.    Patient Education for Self-Quarantine Process    • Following your COVID testing, we strongly recommend that you wear a mask when you are with other people and practice social distancing.   • Limit your activities to only required outings.  • Wash your hands with soap and water frequently for at least 20 seconds.   • Avoid touching your eyes, nose and mouth with unwashed hands.  • Do not share anything - utensils, drinking glasses, food from the same bowl.   • Sanitize household surfaces daily. Include all high touch areas (door handles, light switches, phones, countertops, etc.)    Call your surgeon immediately if you experience any of the following symptoms:  • Sore Throat  • Shortness of Breath or difficulty breathing  • Cough  • Chills  • Body soreness  or muscle pain  • Headache  • Fever  • New loss of taste or smell  • Do not arrive for your surgery ill.  Your procedure will need to be rescheduled to another time.  You will need to call your physician before the day of surgery to avoid any unnecessary exposure to hospital staff as well as other patients.      CHLORHEXIDINE CLOTH INSTRUCTIONS  The morning of surgery follow these instructions using the Chlorhexidine cloths you've been given.  These steps reduce bacteria on the body.  Do not use the cloths near your eyes, ears mouth, genitalia or on open wounds.  Throw the cloths away after use but do not try to flush them down a toilet.      • Open and remove one cloth at a time from the package.    • Leave the cloth unfolded and begin the bathing.  • Massage the skin with the cloths using gentle pressure to remove bacteria.  Do not scrub harshly.   • Follow the steps below with one 2% CHG cloth per area (6 total cloths).  • One cloth for neck, shoulders and chest.  • One cloth for both arms, hands, fingers and underarms (do underarms last).  • One cloth for the abdomen followed by groin.  • One cloth for right leg and foot including between the toes.  • One cloth for left leg and foot including between the toes.  • The last cloth is to be used for the back of the neck, back and buttocks.    Allow the CHG to air dry 3 minutes on the skin which will give it time to work and decrease the chance of irritation.  The skin may feel sticky until it is dry.  Do not rinse with water or any other liquid or you will lose the beneficial effects of the CHG.  If mild skin irritation occurs, do rinse the skin to remove the CHG.  Report this to the nurse at time of admission.  Do not apply lotions, creams, ointments, deodorants or perfumes after using the clothes. Dress in clean clothes before coming to the hospital.

## 2021-10-04 ENCOUNTER — ANESTHESIA EVENT (OUTPATIENT)
Dept: PERIOP | Facility: HOSPITAL | Age: 36
End: 2021-10-04

## 2021-10-04 ENCOUNTER — HOSPITAL ENCOUNTER (OUTPATIENT)
Facility: HOSPITAL | Age: 36
Setting detail: HOSPITAL OUTPATIENT SURGERY
Discharge: HOME | End: 2021-10-04
Attending: SURGERY | Admitting: SURGERY

## 2021-10-04 ENCOUNTER — ANESTHESIA (OUTPATIENT)
Dept: PERIOP | Facility: HOSPITAL | Age: 36
End: 2021-10-04

## 2021-10-04 VITALS
TEMPERATURE: 97.8 F | BODY MASS INDEX: 21.62 KG/M2 | HEART RATE: 62 BPM | WEIGHT: 159.61 LBS | HEIGHT: 72 IN | OXYGEN SATURATION: 99 % | RESPIRATION RATE: 18 BRPM | SYSTOLIC BLOOD PRESSURE: 113 MMHG | DIASTOLIC BLOOD PRESSURE: 71 MMHG

## 2021-10-04 DIAGNOSIS — K40.90 RIGHT INGUINAL HERNIA: ICD-10-CM

## 2021-10-04 PROCEDURE — C1889 IMPLANT/INSERT DEVICE, NOC: HCPCS | Performed by: SURGERY

## 2021-10-04 PROCEDURE — 25010000002 NEOSTIGMINE 5 MG/10ML SOLUTION: Performed by: STUDENT IN AN ORGANIZED HEALTH CARE EDUCATION/TRAINING PROGRAM

## 2021-10-04 PROCEDURE — S2900 ROBOTIC SURGICAL SYSTEM: HCPCS | Performed by: SURGERY

## 2021-10-04 PROCEDURE — 25010000002 FENTANYL CITRATE (PF) 50 MCG/ML SOLUTION: Performed by: STUDENT IN AN ORGANIZED HEALTH CARE EDUCATION/TRAINING PROGRAM

## 2021-10-04 PROCEDURE — 25010000002 ONDANSETRON PER 1 MG: Performed by: STUDENT IN AN ORGANIZED HEALTH CARE EDUCATION/TRAINING PROGRAM

## 2021-10-04 PROCEDURE — 25010000003 CEFAZOLIN IN DEXTROSE 2-4 GM/100ML-% SOLUTION: Performed by: SURGERY

## 2021-10-04 PROCEDURE — C1781 MESH (IMPLANTABLE): HCPCS | Performed by: SURGERY

## 2021-10-04 PROCEDURE — 49650 LAP ING HERNIA REPAIR INIT: CPT | Performed by: SURGERY

## 2021-10-04 PROCEDURE — 25010000002 PROPOFOL 10 MG/ML EMULSION: Performed by: STUDENT IN AN ORGANIZED HEALTH CARE EDUCATION/TRAINING PROGRAM

## 2021-10-04 PROCEDURE — 25010000002 DEXAMETHASONE PER 1 MG: Performed by: STUDENT IN AN ORGANIZED HEALTH CARE EDUCATION/TRAINING PROGRAM

## 2021-10-04 PROCEDURE — 49650 LAP ING HERNIA REPAIR INIT: CPT | Performed by: SPECIALIST/TECHNOLOGIST, OTHER

## 2021-10-04 DEVICE — 3DMAX™ MID ANATOMICAL MESH, LARGE, RIGHT, 4” X 6”, 10 X 16 CM
Type: IMPLANTABLE DEVICE | Site: ABDOMEN | Status: FUNCTIONAL
Brand: 3DMAX™ MID ANATOMICAL MESH

## 2021-10-04 DEVICE — DEV WND/CLS CONTRL TISS STRATAFIX SPIRAL MNCRYL SH 2/0 20CM: Type: IMPLANTABLE DEVICE | Site: ABDOMEN | Status: FUNCTIONAL

## 2021-10-04 RX ORDER — FLUMAZENIL 0.1 MG/ML
0.2 INJECTION INTRAVENOUS AS NEEDED
Status: DISCONTINUED | OUTPATIENT
Start: 2021-10-04 | End: 2021-10-04 | Stop reason: HOSPADM

## 2021-10-04 RX ORDER — MAGNESIUM HYDROXIDE 1200 MG/15ML
LIQUID ORAL AS NEEDED
Status: DISCONTINUED | OUTPATIENT
Start: 2021-10-04 | End: 2021-10-04 | Stop reason: HOSPADM

## 2021-10-04 RX ORDER — OXYCODONE HYDROCHLORIDE AND ACETAMINOPHEN 5; 325 MG/1; MG/1
TABLET ORAL
Qty: 20 TABLET | Refills: 0 | Status: SHIPPED | OUTPATIENT
Start: 2021-10-04 | End: 2021-10-19

## 2021-10-04 RX ORDER — DEXAMETHASONE SODIUM PHOSPHATE 10 MG/ML
INJECTION INTRAMUSCULAR; INTRAVENOUS AS NEEDED
Status: DISCONTINUED | OUTPATIENT
Start: 2021-10-04 | End: 2021-10-04 | Stop reason: SURG

## 2021-10-04 RX ORDER — HYDROCODONE BITARTRATE AND ACETAMINOPHEN 7.5; 325 MG/1; MG/1
1 TABLET ORAL ONCE AS NEEDED
Status: COMPLETED | OUTPATIENT
Start: 2021-10-04 | End: 2021-10-04

## 2021-10-04 RX ORDER — SODIUM CHLORIDE, SODIUM LACTATE, POTASSIUM CHLORIDE, CALCIUM CHLORIDE 600; 310; 30; 20 MG/100ML; MG/100ML; MG/100ML; MG/100ML
9 INJECTION, SOLUTION INTRAVENOUS CONTINUOUS
Status: DISCONTINUED | OUTPATIENT
Start: 2021-10-04 | End: 2021-10-04 | Stop reason: HOSPADM

## 2021-10-04 RX ORDER — KETAMINE HYDROCHLORIDE 10 MG/ML
INJECTION INTRAMUSCULAR; INTRAVENOUS AS NEEDED
Status: DISCONTINUED | OUTPATIENT
Start: 2021-10-04 | End: 2021-10-04 | Stop reason: SURG

## 2021-10-04 RX ORDER — SODIUM CHLORIDE 0.9 % (FLUSH) 0.9 %
3-10 SYRINGE (ML) INJECTION AS NEEDED
Status: DISCONTINUED | OUTPATIENT
Start: 2021-10-04 | End: 2021-10-04 | Stop reason: HOSPADM

## 2021-10-04 RX ORDER — EPHEDRINE SULFATE 50 MG/ML
5 INJECTION, SOLUTION INTRAVENOUS ONCE AS NEEDED
Status: DISCONTINUED | OUTPATIENT
Start: 2021-10-04 | End: 2021-10-04 | Stop reason: HOSPADM

## 2021-10-04 RX ORDER — HYDROMORPHONE HYDROCHLORIDE 1 MG/ML
0.5 INJECTION, SOLUTION INTRAMUSCULAR; INTRAVENOUS; SUBCUTANEOUS
Status: DISCONTINUED | OUTPATIENT
Start: 2021-10-04 | End: 2021-10-04 | Stop reason: HOSPADM

## 2021-10-04 RX ORDER — FENTANYL CITRATE 50 UG/ML
50 INJECTION, SOLUTION INTRAMUSCULAR; INTRAVENOUS
Status: DISCONTINUED | OUTPATIENT
Start: 2021-10-04 | End: 2021-10-04 | Stop reason: HOSPADM

## 2021-10-04 RX ORDER — LABETALOL HYDROCHLORIDE 5 MG/ML
5 INJECTION, SOLUTION INTRAVENOUS
Status: DISCONTINUED | OUTPATIENT
Start: 2021-10-04 | End: 2021-10-04 | Stop reason: HOSPADM

## 2021-10-04 RX ORDER — NALOXONE HCL 0.4 MG/ML
0.2 VIAL (ML) INJECTION AS NEEDED
Status: DISCONTINUED | OUTPATIENT
Start: 2021-10-04 | End: 2021-10-04 | Stop reason: HOSPADM

## 2021-10-04 RX ORDER — DIPHENHYDRAMINE HCL 25 MG
25 CAPSULE ORAL
Status: DISCONTINUED | OUTPATIENT
Start: 2021-10-04 | End: 2021-10-04 | Stop reason: HOSPADM

## 2021-10-04 RX ORDER — LIDOCAINE HYDROCHLORIDE 10 MG/ML
0.5 INJECTION, SOLUTION EPIDURAL; INFILTRATION; INTRACAUDAL; PERINEURAL ONCE AS NEEDED
Status: DISCONTINUED | OUTPATIENT
Start: 2021-10-04 | End: 2021-10-04 | Stop reason: HOSPADM

## 2021-10-04 RX ORDER — MIDAZOLAM HYDROCHLORIDE 1 MG/ML
1 INJECTION INTRAMUSCULAR; INTRAVENOUS
Status: DISCONTINUED | OUTPATIENT
Start: 2021-10-04 | End: 2021-10-04 | Stop reason: HOSPADM

## 2021-10-04 RX ORDER — NEOSTIGMINE METHYLSULFATE 0.5 MG/ML
INJECTION, SOLUTION INTRAVENOUS AS NEEDED
Status: DISCONTINUED | OUTPATIENT
Start: 2021-10-04 | End: 2021-10-04 | Stop reason: SURG

## 2021-10-04 RX ORDER — PROMETHAZINE HYDROCHLORIDE 25 MG/1
25 SUPPOSITORY RECTAL ONCE AS NEEDED
Status: DISCONTINUED | OUTPATIENT
Start: 2021-10-04 | End: 2021-10-04 | Stop reason: HOSPADM

## 2021-10-04 RX ORDER — CEFAZOLIN SODIUM 2 G/100ML
2 INJECTION, SOLUTION INTRAVENOUS ONCE
Status: COMPLETED | OUTPATIENT
Start: 2021-10-04 | End: 2021-10-04

## 2021-10-04 RX ORDER — FENTANYL CITRATE 50 UG/ML
INJECTION, SOLUTION INTRAMUSCULAR; INTRAVENOUS AS NEEDED
Status: DISCONTINUED | OUTPATIENT
Start: 2021-10-04 | End: 2021-10-04 | Stop reason: SURG

## 2021-10-04 RX ORDER — OXYCODONE AND ACETAMINOPHEN 10; 325 MG/1; MG/1
1 TABLET ORAL EVERY 4 HOURS PRN
Status: DISCONTINUED | OUTPATIENT
Start: 2021-10-04 | End: 2021-10-04 | Stop reason: HOSPADM

## 2021-10-04 RX ORDER — FAMOTIDINE 10 MG/ML
20 INJECTION, SOLUTION INTRAVENOUS ONCE
Status: COMPLETED | OUTPATIENT
Start: 2021-10-04 | End: 2021-10-04

## 2021-10-04 RX ORDER — HYDRALAZINE HYDROCHLORIDE 20 MG/ML
5 INJECTION INTRAMUSCULAR; INTRAVENOUS
Status: DISCONTINUED | OUTPATIENT
Start: 2021-10-04 | End: 2021-10-04 | Stop reason: HOSPADM

## 2021-10-04 RX ORDER — LIDOCAINE HYDROCHLORIDE 20 MG/ML
INJECTION, SOLUTION INFILTRATION; PERINEURAL AS NEEDED
Status: DISCONTINUED | OUTPATIENT
Start: 2021-10-04 | End: 2021-10-04 | Stop reason: SURG

## 2021-10-04 RX ORDER — SODIUM CHLORIDE 0.9 % (FLUSH) 0.9 %
3 SYRINGE (ML) INJECTION EVERY 12 HOURS SCHEDULED
Status: DISCONTINUED | OUTPATIENT
Start: 2021-10-04 | End: 2021-10-04 | Stop reason: HOSPADM

## 2021-10-04 RX ORDER — PROPOFOL 10 MG/ML
VIAL (ML) INTRAVENOUS AS NEEDED
Status: DISCONTINUED | OUTPATIENT
Start: 2021-10-04 | End: 2021-10-04 | Stop reason: SURG

## 2021-10-04 RX ORDER — PROMETHAZINE HYDROCHLORIDE 25 MG/1
25 TABLET ORAL ONCE AS NEEDED
Status: DISCONTINUED | OUTPATIENT
Start: 2021-10-04 | End: 2021-10-04 | Stop reason: HOSPADM

## 2021-10-04 RX ORDER — ONDANSETRON 2 MG/ML
4 INJECTION INTRAMUSCULAR; INTRAVENOUS ONCE AS NEEDED
Status: COMPLETED | OUTPATIENT
Start: 2021-10-04 | End: 2021-10-04

## 2021-10-04 RX ORDER — ONDANSETRON 4 MG/1
4 TABLET, FILM COATED ORAL EVERY 6 HOURS PRN
Qty: 10 TABLET | Refills: 0 | Status: SHIPPED | OUTPATIENT
Start: 2021-10-04 | End: 2021-10-19

## 2021-10-04 RX ORDER — ROCURONIUM BROMIDE 10 MG/ML
INJECTION, SOLUTION INTRAVENOUS AS NEEDED
Status: DISCONTINUED | OUTPATIENT
Start: 2021-10-04 | End: 2021-10-04 | Stop reason: SURG

## 2021-10-04 RX ORDER — ONDANSETRON 2 MG/ML
INJECTION INTRAMUSCULAR; INTRAVENOUS AS NEEDED
Status: DISCONTINUED | OUTPATIENT
Start: 2021-10-04 | End: 2021-10-04 | Stop reason: SURG

## 2021-10-04 RX ORDER — DIPHENHYDRAMINE HYDROCHLORIDE 50 MG/ML
12.5 INJECTION INTRAMUSCULAR; INTRAVENOUS
Status: DISCONTINUED | OUTPATIENT
Start: 2021-10-04 | End: 2021-10-04 | Stop reason: HOSPADM

## 2021-10-04 RX ORDER — IBUPROFEN 600 MG/1
600 TABLET ORAL ONCE AS NEEDED
Status: DISCONTINUED | OUTPATIENT
Start: 2021-10-04 | End: 2021-10-04 | Stop reason: HOSPADM

## 2021-10-04 RX ORDER — GLYCOPYRROLATE 0.2 MG/ML
INJECTION INTRAMUSCULAR; INTRAVENOUS AS NEEDED
Status: DISCONTINUED | OUTPATIENT
Start: 2021-10-04 | End: 2021-10-04 | Stop reason: SURG

## 2021-10-04 RX ORDER — BUPIVACAINE HYDROCHLORIDE AND EPINEPHRINE 5; 5 MG/ML; UG/ML
INJECTION, SOLUTION EPIDURAL; INTRACAUDAL; PERINEURAL AS NEEDED
Status: DISCONTINUED | OUTPATIENT
Start: 2021-10-04 | End: 2021-10-04 | Stop reason: HOSPADM

## 2021-10-04 RX ADMIN — FENTANYL CITRATE 25 MCG: 0.05 INJECTION, SOLUTION INTRAMUSCULAR; INTRAVENOUS at 08:35

## 2021-10-04 RX ADMIN — FENTANYL CITRATE 25 MCG: 0.05 INJECTION, SOLUTION INTRAMUSCULAR; INTRAVENOUS at 08:48

## 2021-10-04 RX ADMIN — FAMOTIDINE 20 MG: 10 INJECTION INTRAVENOUS at 06:33

## 2021-10-04 RX ADMIN — KETAMINE HYDROCHLORIDE 20 MG: 10 INJECTION INTRAMUSCULAR; INTRAVENOUS at 07:52

## 2021-10-04 RX ADMIN — ONDANSETRON 4 MG: 2 INJECTION INTRAMUSCULAR; INTRAVENOUS at 09:23

## 2021-10-04 RX ADMIN — DEXAMETHASONE SODIUM PHOSPHATE 8 MG: 10 INJECTION INTRAMUSCULAR; INTRAVENOUS at 07:42

## 2021-10-04 RX ADMIN — FENTANYL CITRATE 25 MCG: 0.05 INJECTION, SOLUTION INTRAMUSCULAR; INTRAVENOUS at 07:31

## 2021-10-04 RX ADMIN — ROCURONIUM BROMIDE 50 MG: 50 INJECTION INTRAVENOUS at 07:32

## 2021-10-04 RX ADMIN — NEOSTIGMINE METHYLSULFATE 3.5 MG: 0.5 INJECTION INTRAVENOUS at 08:33

## 2021-10-04 RX ADMIN — HYDROCODONE BITARTRATE AND ACETAMINOPHEN 1 TABLET: 7.5; 325 TABLET ORAL at 09:23

## 2021-10-04 RX ADMIN — SODIUM CHLORIDE, POTASSIUM CHLORIDE, SODIUM LACTATE AND CALCIUM CHLORIDE 9 ML/HR: 600; 310; 30; 20 INJECTION, SOLUTION INTRAVENOUS at 06:33

## 2021-10-04 RX ADMIN — FENTANYL CITRATE 25 MCG: 0.05 INJECTION, SOLUTION INTRAMUSCULAR; INTRAVENOUS at 08:11

## 2021-10-04 RX ADMIN — PROPOFOL 200 MG: 10 INJECTION, EMULSION INTRAVENOUS at 07:31

## 2021-10-04 RX ADMIN — GLYCOPYRROLATE 0.5 MG: 0.2 INJECTION INTRAMUSCULAR; INTRAVENOUS at 08:33

## 2021-10-04 RX ADMIN — ONDANSETRON 4 MG: 2 INJECTION INTRAMUSCULAR; INTRAVENOUS at 08:36

## 2021-10-04 RX ADMIN — CEFAZOLIN SODIUM 2 G: 2 INJECTION, SOLUTION INTRAVENOUS at 07:21

## 2021-10-04 RX ADMIN — LIDOCAINE HYDROCHLORIDE 100 MG: 20 INJECTION, SOLUTION INFILTRATION; PERINEURAL at 07:31

## 2021-10-04 NOTE — ANESTHESIA POSTPROCEDURE EVALUATION
Patient: Sergio Ferrera    Procedure Summary     Date: 10/04/21 Room / Location: [facility] OR  / [facility] MAIN OR    Anesthesia Start: 0726 Anesthesia Stop: 0852    Procedure: Davinci assisted laparoscopic right inguinal hernia repair with mesh (Right Abdomen) Diagnosis:       Right inguinal hernia      (Right inguinal hernia [K40.90])    Surgeons: Jose Oquendo Jr., MD Provider: Rey Jacinto MD    Anesthesia Type: general ASA Status: 2          Anesthesia Type: general    Vitals  Vitals Value Taken Time   /80 10/04/21 0921   Temp 36.6 °C (97.8 °F) 10/04/21 0920   Pulse 62 10/04/21 0935   Resp 16 10/04/21 0920   SpO2 99 % 10/04/21 0936   Vitals shown include unvalidated device data.        Post Anesthesia Care and Evaluation    Patient location during evaluation: PACU  Patient participation: complete - patient participated  Level of consciousness: awake and alert  Pain management: adequate  Airway patency: patent  Anesthetic complications: No anesthetic complications    Cardiovascular status: acceptable  Respiratory status: acceptable  Hydration status: acceptable    Comments: --------------------            10/04/21               0945     --------------------   BP:       113/74     Pulse:      60       Resp:       18       Temp:                SpO2:      99%      --------------------

## 2021-10-04 NOTE — ANESTHESIA PROCEDURE NOTES
Airway  Urgency: elective    Date/Time: 10/4/2021 7:35 AM  Airway not difficult    General Information and Staff    Patient location during procedure: OR  Anesthesiologist: Rey Jacinto MD  CRNA: Saeid Santos CRNA    Indications and Patient Condition  Indications for airway management: airway protection    Preoxygenated: yes  MILS not maintained throughout  Mask difficulty assessment: 1 - vent by mask    Final Airway Details  Final airway type: endotracheal airway      Successful airway: ETT  Cuffed: yes   Successful intubation technique: direct laryngoscopy  Endotracheal tube insertion site: oral  Blade: Doug  Blade size: 4  ETT size (mm): 7.0  Cormack-Lehane Classification: grade IIa - partial view of glottis  Placement verified by: chest auscultation and capnometry   Cuff volume (mL): 8  Measured from: lips  ETT/EBT  to lips (cm): 21  Number of attempts at approach: 1  Assessment: lips, teeth, and gum same as pre-op and atraumatic intubation

## 2021-10-04 NOTE — OP NOTE
Surgeon: Jose Oquendo Jr., M.D.    Assistant: Matty Soria CSA    An assistant was necessary and provided valuable retraction and exposure, as well as assistance with camera holding, instrument exchanges, and incision closure.    Pre-Operative Diagnosis:     Right inguinal hernia [K40.90]    Post-Operative Diagnosis:    Right inguinal hernia    Procedure Performed:     Da Valerie robot-assisted laparoscopic right inguinal hernia repair    Indications:     The patient is a pleasant 36-year-old male with a bulge in the right groin that is getting larger and increasingly symptomatic.  He presents for da Valerie robot-assisted laparoscopic right inguinal hernia repair.  The patient understands the indications, alternatives, risks, and benefits of the procedure and wishes to proceed.    Procedure:     The patient was identified and taken to the operating room where he was placed in the supine position on the operating table.  Monitors were placed and he underwent general endotracheal anesthesia and was appropriately monitored throughout the case by the anesthesia personnel.  The abdomen was prepped and draped in the standard surgical fashion.  Each incision was infiltrated with 0.5% Marcaine with epinephrine prior to making the incision.  A supraumbilical incision was made and carried through the skin into the subcutaneous tissue.  The abdominal wall was elevated with skin hooks and a Veress needle was inserted through the incision into the abdomen without difficulty.  The abdomen was then insufflated with low pressures encountered initially.  Once fully insufflated, the Veress needle was removed and an 8 mm da Valerie port was placed in the same incision, again with traction applied to the abdominal wall using skin hooks.  The laparoscope was inserted and the area of Veress needle and port insertion was inspected, no injury had occurred.  An 8 mm right mid abdominal port and left mid abdominal port were then placed by  making a skin incision carried through the skin into the subcutaneous tissue and inserting the port through the incision into the abdomen with direct visualization intra-abdominal using laparoscope.  Attention was then turned to the pelvis.   There was an indirect right inguinal hernia but no left inguinal hernia.  The right groin was then infiltrated with 0.5% Marcaine with epinephrine.  The robot was then docked.  Attention was then turned to the right groin.  The peritoneum was then incised from the anterior superior iliac spine all the way to the midline about 6 cm above the hernia defect.  The peritoneum was then reflected off the abdominal wall, first a laterally and then medially.  Along the medial aspect dissection was taken below Denton's ligament to fully expose Denton's ligament.  The hernia sac was then completely reduced.  A Bard 3D max mesh was then placed in the pocket and secured at Denton's ligament with 2-0 Vicryl suture and then to the left and right of the epigastric artery with 2-0 Vicryl suture.  This provided excellent coverage of the hernia.  The peritoneum was then reapproximated with 2-0 barbed suture.  Hemostasis was noted be adequate.  The needles were removed from the abdomen.  The robot was undocked.  The ports were removed.  All skin incisions were closed with a 4-0 Monocryl in a subcuticular fashion followed by Mastisol and Steri-Strips.  The sponge, needle, and instrument counts were correct at the end of the case.  The patient tolerated the procedure well and was transferred to the recovery room in stable condition.    Estimated Blood Loss:      minimal    Specimens:     None    Jose Oquendo Jr., M.D.

## 2021-10-04 NOTE — DISCHARGE INSTRUCTIONS
Dr. Jose Oquendo   [address]  [place], [place] [id number] [phone]    Discharge Instructions for Hernia Surgery      1. Go home, rest and take it easy today; however, you should get up and move about several times today to reduce the risk of developing a clot in your legs.      2. You may experience some dizziness or memory loss from the anesthesia.  This may last for the next 24 hours.  Someone should plan on staying with you for the first 24 hours for your safety.    3. Do not make any important legal decisions or sign any legal papers for the next 24 hours.      4. Eat and drink lightly today.  Start off with liquids, jello, soup, crackers or other bland foods at first. You may advance your diet tomorrow as tolerated as long as you do not experience any nausea or vomiting.     5. You may remove your outer dressings in 2 days.  The white tapes called steri-strips should stay in place.  They will fall off on their own in 1-2 weeks.  Do not worry if they come off sooner.      6. You may notice some bleeding/drainage on your outer dressings. A little bloody drainage is normal. If the bleeding/drainage is such that the bandage cannot absorb it, remove the dressing, apply clean gauze and apply firm pressure for a full 15 minutes.  If the bleeding continues, please call me.    7. You may shower tomorrow.  No tub baths until your incisions are completely healed.     8. No lifting > 20 lbs. until you are seen at your follow-up visit.         9. You have received a prescription for a narcotic pain medicine, as you will have some pain following surgery.   You will not be totally pain free, but your pain medicine should make the pain tolerable.  Please take your pain medicine as prescribed and always take your pills with food to prevent nausea. If you are having severe pain that cannot be controlled by the pain medicine, please contact me.      10. If you had a laparoscopic surgery, it is not unusual to  experience pain/discomfort in your shoulders or under your ribs after surgery.  It is from the gas used during the laparoscopic procedure and usually lasts 1-3 days.  The prescription pain medicine is used to treat the surgical pain and does not typically alleviate this “gassy” pain.     11. No driving for 24 hours and for as long as you are taking your prescription pain medicine.      12. You will need to call the office at [phone] to schedule a follow-up appointment in 2 weeks.           13. Remember to contact me for any of the following:    • Fever > 101 degrees  • Severe pain that cannot be controlled by taking your pain pills  • Severe nausea or vomiting   • Significant bleeding of your incisions  • Drainage that has a bad smell or is yellow or green in appearance  • Any other questions or concerns        Additional Instruction for Inguinal Hernia Patients Only    1. If you did not urinate at the hospital after your surgery or if you feel the need to urinate and cannot, this will necessitate a return to the Emergency Room for placement of a urinary catheter.  You should also notify me as well.  As a rule, you should be able to empty your bladder within 4-6 hours after discharge from the hospital.      You may notice some scrotal bruising and/or swelling. A scrotal support or briefs as well as ice packs may be used to alleviate discomfort                YOU HAD A PAIN PILL AND ZOFRAN AT 0923AM

## 2021-10-19 ENCOUNTER — OFFICE VISIT (OUTPATIENT)
Dept: SURGERY | Facility: CLINIC | Age: 36
End: 2021-10-19

## 2021-10-19 VITALS — WEIGHT: 159 LBS | BODY MASS INDEX: 21.54 KG/M2 | HEIGHT: 72 IN

## 2021-10-19 DIAGNOSIS — Z48.89 POSTOPERATIVE VISIT: Primary | ICD-10-CM

## 2021-10-19 PROCEDURE — 99024 POSTOP FOLLOW-UP VISIT: CPT | Performed by: SURGERY

## 2021-10-19 NOTE — PROGRESS NOTES
Subjective   Sergio Ferrrea is a 36 y.o. male who returns to the office after undergoing a NC robot-assisted laparoscopic right inguinal repair on 10/4/2021.     History of Present Illness     The patient is recovering well with no significant postop symptoms.  He is having no abdominal pain.  He has a good appetite and normal bowel function.  His energy level is good.      Review of Systems   Constitutional: Negative for activity change, appetite change, fatigue and fever.   Respiratory: Negative for chest tightness and shortness of breath.    Cardiovascular: Negative for chest pain and palpitations.   Gastrointestinal: Negative for abdominal pain, constipation, diarrhea and nausea.   Skin: Negative for rash and wound.   Psychiatric/Behavioral: Negative for agitation and confusion.       Objective   Physical Exam  Constitutional:       General: He is not in acute distress.     Appearance: Normal appearance. He is not ill-appearing or toxic-appearing.   Pulmonary:      Effort: Pulmonary effort is normal. No respiratory distress.   Abdominal:      Palpations: Abdomen is soft.      Tenderness: There is no abdominal tenderness.   Skin:     Comments: Incision: intact with no evidence of infection.   Neurological:      Mental Status: He is alert.   Psychiatric:         Behavior: Behavior normal.         Assessment/Plan   The encounter diagnosis was Postoperative visit.    The patient is recovering well from his da Valerie robot assisted laparoscopic right inguinal hernia repair.  At this point he has no limitations.  He will follow up on an as-needed basis.

## (undated) DEVICE — DRAPE,REIN 53X77,STERILE: Brand: MEDLINE

## (undated) DEVICE — TIP COVER ACCESSORY

## (undated) DEVICE — GLV SURG BIOGEL LTX PF 8

## (undated) DEVICE — DRSNG SURESITE WNDW 4X4.5

## (undated) DEVICE — DRSNG GZ CURAD XEROFORM NONADHS 5X9IN STRL

## (undated) DEVICE — LOU GENERAL ROBOT: Brand: MEDLINE INDUSTRIES, INC.

## (undated) DEVICE — VISUALIZATION SYSTEM: Brand: CLEARIFY

## (undated) DEVICE — CANNULA SEAL

## (undated) DEVICE — NDL HYPO PRECISIONGLIDE REG 25G 1 1/2

## (undated) DEVICE — GLV SURG PREMIERPRO ORTHO LTX PF SZ7.5 BRN

## (undated) DEVICE — SYR LUERLOK 5CC

## (undated) DEVICE — VIOLET BRAIDED (POLYGLACTIN 910), SYNTHETIC ABSORBABLE SUTURE: Brand: COATED VICRYL

## (undated) DEVICE — MEDI-VAC YANKAUER SUCTION HANDLE W/BULBOUS TIP: Brand: CARDINAL HEALTH

## (undated) DEVICE — BLADELESS OBTURATOR: Brand: WECK VISTA

## (undated) DEVICE — SOL ISO/ALC RUB 70PCT 4OZ

## (undated) DEVICE — SOL ANTISTICK CAUTRY ELECTROLUBE LF

## (undated) DEVICE — PROXIMATE RH ROTATING HEAD SKIN STAPLERS (35 WIDE) CONTAINS 35 STAINLESS STEEL STAPLES: Brand: PROXIMATE

## (undated) DEVICE — ANTIBACTERIAL UNDYED BRAIDED (POLYGLACTIN 910), SYNTHETIC ABSORBABLE SUTURE: Brand: COATED VICRYL

## (undated) DEVICE — POOLE SUCTION HANDLE: Brand: CARDINAL HEALTH

## (undated) DEVICE — DRAPE,U/ SHT,SPLIT,PLAS,STERIL: Brand: MEDLINE

## (undated) DEVICE — Device

## (undated) DEVICE — IMMOB KN 3PNL DLX CANVS 22IN BLU

## (undated) DEVICE — 3M™ STERI-STRIP™ COMPOUND BENZOIN TINCTURE 40 BAGS/CARTON 4 CARTONS/CASE C1544: Brand: 3M™ STERI-STRIP™

## (undated) DEVICE — BNDG ESMARK STRL 6INX12FT LF

## (undated) DEVICE — BNDG ELAS ELITE V/CLOSE 4IN 5YD LF STRL

## (undated) DEVICE — BNDG ELAS ELITE V/CLOSE 6IN 5YD LF STRL

## (undated) DEVICE — CULT AER/ANAEROB FASTIDIOUS BACT

## (undated) DEVICE — COLUMN DRAPE

## (undated) DEVICE — SPNG GZ WOVN 4X4IN 12PLY 10/BX STRL

## (undated) DEVICE — GLV SURG PREMIERPRO ORTHO LTX PF SZ8 BRN

## (undated) DEVICE — PAD GRND REM POLYHESIVE A/ DISP

## (undated) DEVICE — MAT FLR ABSORBENT LG 4FT 10 2.5FT

## (undated) DEVICE — GLV SURG TRIUMPH CLASSIC PF LTX 8 STRL

## (undated) DEVICE — SUT MNCRYL PLS ANTIB UD 4/0 PS2 18IN

## (undated) DEVICE — 3M™ STERI-STRIP™ REINFORCED ADHESIVE SKIN CLOSURES, R1547, 1/2 IN X 4 IN (12 MM X 100 MM), 6 STRIPS/ENVELOPE: Brand: 3M™ STERI-STRIP™

## (undated) DEVICE — APPL CHLORAPREP W/TINT 26ML ORNG

## (undated) DEVICE — SCRW CORT S/TAP 3.5X55MM
Type: IMPLANTABLE DEVICE | Site: TIBIA | Status: NON-FUNCTIONAL
Removed: 2020-01-21

## (undated) DEVICE — PREP SOL POVIDONE/IODINE BT 4OZ

## (undated) DEVICE — UNDERCAST PADDING: Brand: DEROYAL

## (undated) DEVICE — SCRW LK S/TAP STRDRV 3.5X60MM
Type: IMPLANTABLE DEVICE | Site: TIBIA | Status: NON-FUNCTIONAL
Removed: 2020-01-21

## (undated) DEVICE — BNDG ADHS PLSTC 1X3IN LF

## (undated) DEVICE — GOWN,PREVENTION PLUS,XLONG/XLARGE,STRL: Brand: MEDLINE

## (undated) DEVICE — PK ORTHO MAJ 40

## (undated) DEVICE — DRP C/ARMOR

## (undated) DEVICE — COVER,TABLE,HEAVY DUTY,79"X110",STRL: Brand: MEDLINE

## (undated) DEVICE — DRSNG WND GEL FIBR OPTICELL AG PLS W/SLV LF 4X5IN  STRL

## (undated) DEVICE — T-DRAPE,EXTREMITY,STERILE: Brand: MEDLINE

## (undated) DEVICE — PAD,ABDOMINAL,8"X10",ST,LF: Brand: MEDLINE

## (undated) DEVICE — OPTIFOAM GENTLE SA, POSTOP, 4X8: Brand: MEDLINE

## (undated) DEVICE — ARM DRAPE

## (undated) DEVICE — 3M™ IOBAN™ 2 ANTIMICROBIAL INCISE DRAPE 6640EZ: Brand: IOBAN™ 2

## (undated) DEVICE — SKIN PREP TRAY W/CHG: Brand: MEDLINE INDUSTRIES, INC.

## (undated) DEVICE — TUBING, SUCTION, 1/4" X 20', STRAIGHT: Brand: MEDLINE INDUSTRIES, INC.

## (undated) DEVICE — BIT DRL QC DIA 2.5X110MM

## (undated) DEVICE — COVER,MAYO STAND,STERILE: Brand: MEDLINE